# Patient Record
Sex: FEMALE | Race: WHITE | NOT HISPANIC OR LATINO | Employment: FULL TIME | URBAN - METROPOLITAN AREA
[De-identification: names, ages, dates, MRNs, and addresses within clinical notes are randomized per-mention and may not be internally consistent; named-entity substitution may affect disease eponyms.]

---

## 2017-04-24 ENCOUNTER — OFFICE VISIT (OUTPATIENT)
Dept: OBSTETRICS AND GYNECOLOGY | Facility: CLINIC | Age: 57
End: 2017-04-24
Payer: COMMERCIAL

## 2017-04-24 VITALS
HEART RATE: 70 BPM | BODY MASS INDEX: 23.92 KG/M2 | SYSTOLIC BLOOD PRESSURE: 110 MMHG | DIASTOLIC BLOOD PRESSURE: 78 MMHG | HEIGHT: 63 IN | RESPIRATION RATE: 16 BRPM | WEIGHT: 135 LBS

## 2017-04-24 DIAGNOSIS — R87.610 ASCUS WITH POSITIVE HIGH RISK HPV CERVICAL: Primary | ICD-10-CM

## 2017-04-24 DIAGNOSIS — R87.810 ASCUS WITH POSITIVE HIGH RISK HPV CERVICAL: Primary | ICD-10-CM

## 2017-04-24 PROCEDURE — 88141 CYTOPATH C/V INTERPRET: CPT | Mod: ,,, | Performed by: PATHOLOGY

## 2017-04-24 PROCEDURE — 88175 CYTOPATH C/V AUTO FLUID REDO: CPT | Performed by: PATHOLOGY

## 2017-04-24 PROCEDURE — 1160F RVW MEDS BY RX/DR IN RCRD: CPT | Mod: S$GLB,,, | Performed by: OBSTETRICS & GYNECOLOGY

## 2017-04-24 PROCEDURE — 99999 PR PBB SHADOW E&M-EST. PATIENT-LVL III: CPT | Mod: PBBFAC,,, | Performed by: OBSTETRICS & GYNECOLOGY

## 2017-04-24 PROCEDURE — 99213 OFFICE O/P EST LOW 20 MIN: CPT | Mod: S$GLB,,, | Performed by: OBSTETRICS & GYNECOLOGY

## 2017-04-24 NOTE — PROGRESS NOTES
Subjective:    Patient ID: Cyndy Lau is a 57 y.o. female    Chief Complaint:   Chief Complaint   Patient presents with    Follow-up     6 month repeat pap        History of Present Illness:  Cyndy BREAUX presents today for follow up of an abnormal PAP. Her last PAP was performed on 8/16/2016 and was read as ASCUS and positive HPV. She has not had problems with vaginal discharge or abnormal bleeding. She denies pelvic pain.  Colposcopy findings on 10/3/2016 revealed normal exam without visible pathology. ECC was negative.    Review of Systems   Constitutional: Negative for activity change, appetite change, chills, diaphoresis, fatigue, fever and unexpected weight change.   Respiratory: Negative for cough, shortness of breath and wheezing.    Cardiovascular: Negative for chest pain, palpitations and leg swelling.   Gastrointestinal: Negative for abdominal pain, blood in stool, constipation, diarrhea, nausea and vomiting.   Endocrine: Negative for diabetes, hair loss, hot flashes, hyperthyroidism and hypothyroidism.   Genitourinary: Negative for decreased libido, dyspareunia, dysuria, flank pain, frequency, genital sores, hematuria, menorrhagia, menstrual problem, pelvic pain, urgency, vaginal bleeding, vaginal discharge, vaginal pain, urinary incontinence, postcoital bleeding and vaginal odor.   Hematological: Negative for adenopathy. Does not bruise/bleed easily.   Psychiatric/Behavioral: Negative for sleep disturbance. The patient is not nervous/anxious.    Breast: Negative for breast pain and nipple discharge        Objective:    Vital Signs  Vitals:    04/24/17 0855   BP: 110/78   Pulse: 70   Resp: 16         Physical Exam:  General:  alert,normal appearing gravid female   Abdomen:  soft, non-tender. Bowel sounds normal. No masses,  no organomegaly   Pelvis: External genitalia: normal general appearance  Urinary system: urethral meatus normal, bladder nontender  Vaginal: normal mucosa without prolapse or  lesions  Cervix: normal appearance  Uterus: normal single, nontender  Adnexa: normal bimanual exam       Assessment:      1. ASCUS with positive high risk HPV cervical          Plan:      ASCUS with positive high risk HPV cervical  -     Liquid-based pap smear, diagnostic    Other orders  -     Cancel: Liquid-based pap smear, diagnostic

## 2017-04-28 ENCOUNTER — OFFICE VISIT (OUTPATIENT)
Dept: OBSTETRICS AND GYNECOLOGY | Facility: CLINIC | Age: 57
End: 2017-04-28
Payer: COMMERCIAL

## 2017-04-28 ENCOUNTER — TELEPHONE (OUTPATIENT)
Dept: OBSTETRICS AND GYNECOLOGY | Facility: CLINIC | Age: 57
End: 2017-04-28

## 2017-04-28 VITALS
SYSTOLIC BLOOD PRESSURE: 114 MMHG | BODY MASS INDEX: 23.78 KG/M2 | DIASTOLIC BLOOD PRESSURE: 72 MMHG | HEIGHT: 63 IN | WEIGHT: 134.19 LBS | HEART RATE: 73 BPM | RESPIRATION RATE: 12 BRPM

## 2017-04-28 DIAGNOSIS — N93.9 VAGINAL BLEEDING: Primary | ICD-10-CM

## 2017-04-28 DIAGNOSIS — R39.9 UTI SYMPTOMS: ICD-10-CM

## 2017-04-28 DIAGNOSIS — K59.09 OTHER CONSTIPATION: ICD-10-CM

## 2017-04-28 LAB
BILIRUB SERPL-MCNC: ABNORMAL MG/DL
BLOOD URINE, POC: ABNORMAL
COLOR, POC UA: ABNORMAL
GLUCOSE UR QL STRIP: ABNORMAL
KETONES UR QL STRIP: ABNORMAL
LEUKOCYTE ESTERASE URINE, POC: ABNORMAL
NITRITE, POC UA: ABNORMAL
PH, POC UA: 7
PROTEIN, POC: ABNORMAL
SPECIFIC GRAVITY, POC UA: 1.01
UROBILINOGEN, POC UA: ABNORMAL

## 2017-04-28 PROCEDURE — 81002 URINALYSIS NONAUTO W/O SCOPE: CPT | Mod: S$GLB,,, | Performed by: OBSTETRICS & GYNECOLOGY

## 2017-04-28 PROCEDURE — 99999 PR PBB SHADOW E&M-EST. PATIENT-LVL III: CPT | Mod: PBBFAC,,, | Performed by: OBSTETRICS & GYNECOLOGY

## 2017-04-28 PROCEDURE — 99213 OFFICE O/P EST LOW 20 MIN: CPT | Mod: 25,S$GLB,, | Performed by: OBSTETRICS & GYNECOLOGY

## 2017-04-28 PROCEDURE — 87086 URINE CULTURE/COLONY COUNT: CPT

## 2017-04-28 PROCEDURE — 1160F RVW MEDS BY RX/DR IN RCRD: CPT | Mod: S$GLB,,, | Performed by: OBSTETRICS & GYNECOLOGY

## 2017-04-28 NOTE — PROGRESS NOTES
Subjective:       Patient ID: Cyndy Lau is a 57 y.o. female.    Chief Complaint:  Vaginal Bleeding (pt states she had a pap done monday and has had on and off spotting that has gotten heavier today) and Pelvic Pain      History of Present Illness  HPI  Patient presents for evaluation of bleeding that started after pap last week; bleeding was initially heavy but is now improving.  Bleeding is now light and only with wiping.  She has no other urinary complaints of urgency/frequency/dysuria. She complains of some pressure and pain in her pelvis over the past week.    GYN & OB History  Patient's last menstrual period was 2013.   Date of Last Pap: 2016    OB History    Para Term  AB SAB TAB Ectopic Multiple Living   2 2 2       2      # Outcome Date GA Lbr Jamin/2nd Weight Sex Delivery Anes PTL Lv   2 Term         Y   1 Term         Y          Review of Systems  Review of Systems   Constitutional: Negative for chills, fatigue and fever.   Gastrointestinal: Positive for constipation. Negative for abdominal distention, abdominal pain, diarrhea, nausea and vomiting.   Genitourinary: Positive for pelvic pain and vaginal bleeding. Negative for decreased urine volume, difficulty urinating, dysuria, frequency, vaginal discharge and vaginal pain.           Objective:    Physical Exam:   Constitutional: She is oriented to person, place, and time. She appears well-developed and well-nourished. No distress.    HENT:   Head: Normocephalic and atraumatic.    Eyes: Conjunctivae and EOM are normal.    Neck: Normal range of motion. Neck supple.     Pulmonary/Chest: Effort normal.          Genitourinary: There is no tenderness or lesion on the right labia. There is no tenderness or lesion on the left labia. Uterus is not enlarged and not tender. Right adnexum displays no mass and no tenderness. Left adnexum displays no mass and no tenderness. No erythema or bleeding in the vagina. No foreign body in the  vagina. No signs of injury around the vagina. No vaginal discharge found. Cervix exhibits no motion tenderness and no friability.           Musculoskeletal: Normal range of motion. She exhibits no deformity.       Neurological: She is alert and oriented to person, place, and time.    Skin: Skin is warm and dry.    Psychiatric: She has a normal mood and affect. Her behavior is normal. Judgment and thought content normal.          Assessment:        1. Vaginal bleeding    2. UTI symptoms    3. Other constipation               Plan:      Cyndy BREAUX was seen today for vaginal bleeding and pelvic pain.    Diagnoses and all orders for this visit:    Vaginal bleeding  -     POCT URINE DIPSTICK WITHOUT MICROSCOPE  -     Urine culture; Future    UTI symptoms  -     POCT URINE DIPSTICK WITHOUT MICROSCOPE  -     Urine culture; Future    Other constipation    No visible blood present in vagina on exam.  Will further evaluate urine for possible infection.  Constipation noted on exam discussed with patient. Increase H2O hydration and start colace.

## 2017-04-28 NOTE — TELEPHONE ENCOUNTER
Pt complaining of spotting since pap 4/24/17. Yesterday bleeding increased and began cramping. Pt scheduled today for evaluation.

## 2017-04-28 NOTE — TELEPHONE ENCOUNTER
----- Message from Lauren Grigsby sent at 2017  7:57 AM CDT -----  Contact: Self  Cyndy Lau  MRN: [id number]  : 1960  PCP: Carlin Shafer  Home Phone      [phone]  Work Phone      Not on file.  Mobile          [phone]      MESSAGE:   Patient states she is having cramping and heavy spotting after getting her pap smear on 2017. Please return call @ [phone]. Thanks.

## 2017-04-30 LAB — BACTERIA UR CULT: NO GROWTH

## 2017-05-15 ENCOUNTER — OFFICE VISIT (OUTPATIENT)
Dept: OBSTETRICS AND GYNECOLOGY | Facility: CLINIC | Age: 57
End: 2017-05-15
Payer: COMMERCIAL

## 2017-05-15 VITALS
HEIGHT: 63 IN | WEIGHT: 136 LBS | DIASTOLIC BLOOD PRESSURE: 78 MMHG | BODY MASS INDEX: 24.1 KG/M2 | HEART RATE: 82 BPM | SYSTOLIC BLOOD PRESSURE: 120 MMHG | RESPIRATION RATE: 16 BRPM

## 2017-05-15 DIAGNOSIS — R87.615 UNSATISFACTORY CERVICAL PAPANICOLAOU SMEAR: Primary | ICD-10-CM

## 2017-05-15 PROCEDURE — 1160F RVW MEDS BY RX/DR IN RCRD: CPT | Mod: S$GLB,,, | Performed by: OBSTETRICS & GYNECOLOGY

## 2017-05-15 PROCEDURE — 88175 CYTOPATH C/V AUTO FLUID REDO: CPT

## 2017-05-15 PROCEDURE — 99213 OFFICE O/P EST LOW 20 MIN: CPT | Mod: S$GLB,,, | Performed by: OBSTETRICS & GYNECOLOGY

## 2017-05-15 PROCEDURE — 99999 PR PBB SHADOW E&M-EST. PATIENT-LVL III: CPT | Mod: PBBFAC,,, | Performed by: OBSTETRICS & GYNECOLOGY

## 2017-05-15 NOTE — PROGRESS NOTES
Subjective:    Patient ID: Cyndy Lau is a 57 y.o. female    Chief Complaint:   Chief Complaint   Patient presents with    Gynecologic Exam     repeat pap--- unsat.       History of Present Illness:  Cyndy BREAUX presents today for follow up of an Unsatisfactory PAP. Her last PAP was performed on 8/16/2016 and was read as unsatisfactory due to too few cells.     Review of Systems   Constitutional: Negative for activity change, appetite change, chills, diaphoresis, fatigue, fever and unexpected weight change.   Respiratory: Negative for cough, shortness of breath and wheezing.    Cardiovascular: Negative for chest pain, palpitations and leg swelling.   Gastrointestinal: Negative for abdominal pain, blood in stool, constipation, diarrhea, nausea and vomiting.   Endocrine: Negative for diabetes, hair loss, hot flashes, hyperthyroidism and hypothyroidism.   Genitourinary: Negative for decreased libido, dyspareunia, dysuria, flank pain, frequency, genital sores, hematuria, menorrhagia, menstrual problem, pelvic pain, urgency, vaginal bleeding, vaginal discharge, vaginal pain, urinary incontinence, postcoital bleeding and vaginal odor.   Hematological: Negative for adenopathy. Does not bruise/bleed easily.   Psychiatric/Behavioral: Negative for sleep disturbance. The patient is not nervous/anxious.    Breast: Negative for breast pain and nipple discharge        Objective:    Vital Signs  Vitals:    05/15/17 1155   BP: 120/78   Pulse: 82   Resp: 16         Physical Exam:  General:  alert,normal appearing gravid female   Abdomen:  soft, non-tender. Bowel sounds normal. No masses,  no organomegaly   Pelvis: External genitalia: normal general appearance  Urinary system: urethral meatus normal, bladder nontender  Vaginal: normal mucosa without prolapse or lesions  Cervix: normal appearance  Uterus: normal single, nontender  Adnexa: normal bimanual exam       Assessment:      1. Unsatisfactory cervical Papanicolaou smear           Plan:      Unsatisfactory cervical Papanicolaou smear  -     Liquid-based pap smear, screening

## 2017-10-10 ENCOUNTER — TELEPHONE (OUTPATIENT)
Dept: OBSTETRICS AND GYNECOLOGY | Facility: CLINIC | Age: 57
End: 2017-10-10

## 2017-10-10 DIAGNOSIS — Z12.31 ENCOUNTER FOR SCREENING MAMMOGRAM FOR BREAST CANCER: Primary | ICD-10-CM

## 2017-10-10 NOTE — TELEPHONE ENCOUNTER
----- Message from Latoya Cisneros MA sent at 10/10/2017  9:25 AM CDT -----  Contact: self  Cyndy Lau  MRN: [id number]  Home Phone      [phone]  Work Phone      Not on file.  Mobile          [phone]    Patient Care Team:  Carlin Shafer MD as PCP - General (Family Medicine)  Lorena Vanessa MD as Obstetrician (Obstetrics and Gynecology)  OB? No  What phone number can you be reached at? [phone]  Message:   Please link mammo orders to appt 11/07/17.  Thanks!

## 2017-11-07 ENCOUNTER — OFFICE VISIT (OUTPATIENT)
Dept: OBSTETRICS AND GYNECOLOGY | Facility: CLINIC | Age: 57
End: 2017-11-07
Payer: COMMERCIAL

## 2017-11-07 ENCOUNTER — HOSPITAL ENCOUNTER (OUTPATIENT)
Dept: RADIOLOGY | Facility: HOSPITAL | Age: 57
Discharge: HOME | End: 2017-11-07
Attending: OBSTETRICS & GYNECOLOGY
Payer: COMMERCIAL

## 2017-11-07 VITALS
WEIGHT: 136 LBS | BODY MASS INDEX: 24.1 KG/M2 | DIASTOLIC BLOOD PRESSURE: 80 MMHG | HEIGHT: 63 IN | SYSTOLIC BLOOD PRESSURE: 120 MMHG | HEART RATE: 78 BPM | RESPIRATION RATE: 16 BRPM

## 2017-11-07 VITALS — WEIGHT: 136 LBS | BODY MASS INDEX: 24.1 KG/M2 | HEIGHT: 63 IN

## 2017-11-07 DIAGNOSIS — Z12.31 ENCOUNTER FOR SCREENING MAMMOGRAM FOR BREAST CANCER: ICD-10-CM

## 2017-11-07 DIAGNOSIS — G47.09 OTHER INSOMNIA: ICD-10-CM

## 2017-11-07 DIAGNOSIS — Z01.419 WELL WOMAN EXAM WITH ROUTINE GYNECOLOGICAL EXAM: Primary | ICD-10-CM

## 2017-11-07 DIAGNOSIS — Z87.42 HISTORY OF ABNORMAL CERVICAL PAP SMEAR: ICD-10-CM

## 2017-11-07 PROCEDURE — 77063 BREAST TOMOSYNTHESIS BI: CPT | Mod: 26,,, | Performed by: RADIOLOGY

## 2017-11-07 PROCEDURE — 88175 CYTOPATH C/V AUTO FLUID REDO: CPT

## 2017-11-07 PROCEDURE — 99999 PR PBB SHADOW E&M-EST. PATIENT-LVL III: CPT | Mod: PBBFAC,,, | Performed by: OBSTETRICS & GYNECOLOGY

## 2017-11-07 PROCEDURE — 77067 SCR MAMMO BI INCL CAD: CPT | Mod: 26,,, | Performed by: RADIOLOGY

## 2017-11-07 PROCEDURE — 99396 PREV VISIT EST AGE 40-64: CPT | Mod: S$GLB,,, | Performed by: OBSTETRICS & GYNECOLOGY

## 2017-11-07 PROCEDURE — 77067 SCR MAMMO BI INCL CAD: CPT | Mod: TC

## 2017-11-07 RX ORDER — MAGNESIUM 30 MG
TABLET ORAL ONCE
COMMUNITY

## 2017-11-07 RX ORDER — IBUPROFEN 100 MG/5ML
2000 SUSPENSION, ORAL (FINAL DOSE FORM) ORAL DAILY
COMMUNITY

## 2017-11-07 RX ORDER — CETIRIZINE HYDROCHLORIDE 10 MG/1
10 TABLET ORAL DAILY
COMMUNITY

## 2017-11-07 RX ORDER — CLONAZEPAM 0.5 MG/1
0.5 TABLET ORAL DAILY PRN
Qty: 30 TABLET | Refills: 3 | Status: SHIPPED | OUTPATIENT
Start: 2017-11-07 | End: 2020-03-11 | Stop reason: SDUPTHER

## 2017-11-07 NOTE — PROGRESS NOTES
Subjective:    Patient ID: Cyndy Lau is a 57 y.o. female.     Chief Complaint: Annual Well Woman Exam     History of Present Illness:  Cyndy BREAUX presents today for Annual Well Woman exam. .Patient's last menstrual period was 2013.. She is currently using no hormone replacement therapy and she reports no problems with hot flashes, night sweats, irritability and vaginal dryness. She denies breast tenderness, masses, nipple discharge.  She reports no problems with urination. Bowel movements have not significantly changed. She had an abnormal PAP in 2016 which was read as ASCUS with +HR HPV. Repeat PAP 6 months ago was normal.    Menstrual History:   Patient's last menstrual period was 2013..     OB History      Para Term  AB Living    2 2 2     2    SAB TAB Ectopic Multiple Live Births            2          Review of Systems   Constitutional: Negative for activity change, appetite change, chills, diaphoresis, fatigue, fever and unexpected weight change.   HENT: Negative for mouth sores and tinnitus.    Eyes: Negative for discharge and visual disturbance.   Respiratory: Negative for cough, shortness of breath and wheezing.    Cardiovascular: Negative for chest pain, palpitations and leg swelling.   Gastrointestinal: Negative for abdominal pain, blood in stool, constipation, diarrhea, nausea and vomiting.   Endocrine: Negative for diabetes, hair loss, hot flashes, hyperthyroidism and hypothyroidism.   Genitourinary: Negative for decreased libido, dyspareunia, dysuria, flank pain, frequency, genital sores, hematuria, menorrhagia, menstrual problem, pelvic pain, urgency, vaginal bleeding, vaginal discharge, vaginal pain, urinary incontinence, postcoital bleeding and vaginal odor.   Musculoskeletal: Negative for back pain, joint swelling and myalgias.   Skin:  Negative for rash, no acne and hair changes.   Neurological: Negative for seizures, syncope, numbness and headaches.   Hematological:  Negative for adenopathy. Does not bruise/bleed easily.   Psychiatric/Behavioral: Positive for sleep disturbance. The patient is not nervous/anxious.    Breast: Negative for breast pain and nipple discharge        Objective:    Vital Signs:  Vitals:    11/07/17 1643   BP: 120/80   Pulse: 78   Resp: 16       Physical Exam:  General:  alert,normal appearing gravid female   Skin:  Skin color, texture, turgor normal. No rashes or lesions   HEENT:  conjunctivae/corneas clear. PERRL.   Neck: supple, trachea midline, no adenopathy or thyromegally   Respiratory:  clear to auscultation bilaterally   Heart:  regular rate and rhythm, S1, S2 normal, no murmur, click, rub or gallop   Breasts:   Nipples are protruding and have no nipple discharge. No palpable masses, erythema, skin changes, tenderness, or adenopathy.   Abdomen:  soft, non-tender. Bowel sounds normal. No masses,  no organomegaly   Pelvis: External genitalia: normal general appearance  Urinary system: urethral meatus normal, bladder nontender  Vaginal: normal mucosa without prolapse or lesions, atrophic mucosa  Cervix: normal appearance  Uterus: normal single, nontender  Adnexa: normal bimanual exam   Extremities: Normal ROM; no edema, no cyanosis   Neurologial: Normal strength and tone. No focal numbness or weakness. Reflexes 2+ and equal.   Psychiatric: normal mood, speech, dress, and thought processes         Assessment:      1. Well woman exam with routine gynecological exam    2. History of abnormal cervical Pap smear    3. Other insomnia          Plan:      Well woman exam with routine gynecological exam    History of abnormal cervical Pap smear  -     Liquid-based pap smear, diagnostic    Other insomnia  -     clonazePAM (KLONOPIN) 0.5 MG tablet; Take 1 tablet (0.5 mg total) by mouth daily as needed for Anxiety.  Dispense: 30 tablet; Refill: 3        COUNSELING:  Cyndy BREAUX was counseled on A.C.O.G. Pap guidelines and recommendations for yearly pelvic exams in  addition to recommendations for yearly mammograms and monthly self breast exams. In addition she was counseled on adequate intake of calcium and vitamin D; to see her PCP for other health maintenance.

## 2018-10-03 ENCOUNTER — TELEPHONE (OUTPATIENT)
Dept: OBSTETRICS AND GYNECOLOGY | Facility: CLINIC | Age: 58
End: 2018-10-03

## 2018-10-03 DIAGNOSIS — Z12.39 BREAST CANCER SCREENING: Primary | ICD-10-CM

## 2018-10-03 NOTE — TELEPHONE ENCOUNTER
----- Message from Payton Tristan MA sent at 10/3/2018  8:53 AM CDT -----  Contact: Self  Cyndy Lau  MRN: [id number]  Home Phone      [phone]  Work Phone      Not on file.  Mobile          [phone]    Patient Care Team:  Carlin Shafer MD as PCP - General (Family Medicine)  Lorena Vanessa MD as Obstetrician (Obstetrics and Gynecology)  OB? No  What phone number can you be reached at? [phone]  Message: Please place and link mammogram orders to appointment scheduled on 12/10/2018.

## 2018-12-10 ENCOUNTER — HOSPITAL ENCOUNTER (OUTPATIENT)
Dept: RADIOLOGY | Facility: HOSPITAL | Age: 58
Discharge: HOME | End: 2018-12-10
Attending: OBSTETRICS & GYNECOLOGY
Payer: COMMERCIAL

## 2018-12-10 VITALS — HEIGHT: 63 IN | BODY MASS INDEX: 24.1 KG/M2 | WEIGHT: 136 LBS

## 2018-12-10 DIAGNOSIS — Z12.39 BREAST CANCER SCREENING: ICD-10-CM

## 2018-12-10 PROCEDURE — 77067 SCR MAMMO BI INCL CAD: CPT | Mod: 26,,, | Performed by: RADIOLOGY

## 2018-12-10 PROCEDURE — 77063 BREAST TOMOSYNTHESIS BI: CPT | Mod: 26,,, | Performed by: RADIOLOGY

## 2018-12-10 PROCEDURE — 77063 BREAST TOMOSYNTHESIS BI: CPT | Mod: TC

## 2019-02-11 ENCOUNTER — OFFICE VISIT (OUTPATIENT)
Dept: OBSTETRICS AND GYNECOLOGY | Facility: CLINIC | Age: 59
End: 2019-02-11
Payer: COMMERCIAL

## 2019-02-11 VITALS
WEIGHT: 136.81 LBS | SYSTOLIC BLOOD PRESSURE: 112 MMHG | RESPIRATION RATE: 18 BRPM | BODY MASS INDEX: 24.24 KG/M2 | HEART RATE: 80 BPM | HEIGHT: 63 IN | DIASTOLIC BLOOD PRESSURE: 60 MMHG

## 2019-02-11 DIAGNOSIS — Z11.59 ENCOUNTER FOR HEPATITIS C SCREENING TEST FOR LOW RISK PATIENT: ICD-10-CM

## 2019-02-11 DIAGNOSIS — Z01.419 WELL WOMAN EXAM WITH ROUTINE GYNECOLOGICAL EXAM: Primary | ICD-10-CM

## 2019-02-11 DIAGNOSIS — Z12.4 CERVICAL CANCER SCREENING: ICD-10-CM

## 2019-02-11 DIAGNOSIS — M81.0 POSTMENOPAUSAL BONE LOSS: ICD-10-CM

## 2019-02-11 DIAGNOSIS — Z12.11 COLON CANCER SCREENING: ICD-10-CM

## 2019-02-11 PROCEDURE — 88175 CYTOPATH C/V AUTO FLUID REDO: CPT

## 2019-02-11 PROCEDURE — 99396 PREV VISIT EST AGE 40-64: CPT | Mod: S$GLB,,, | Performed by: OBSTETRICS & GYNECOLOGY

## 2019-02-11 PROCEDURE — 99999 PR PBB SHADOW E&M-EST. PATIENT-LVL III: CPT | Mod: PBBFAC,,, | Performed by: OBSTETRICS & GYNECOLOGY

## 2019-02-11 PROCEDURE — 99396 PR PREVENTIVE VISIT,EST,40-64: ICD-10-PCS | Mod: S$GLB,,, | Performed by: OBSTETRICS & GYNECOLOGY

## 2019-02-11 PROCEDURE — 99999 PR PBB SHADOW E&M-EST. PATIENT-LVL III: ICD-10-PCS | Mod: PBBFAC,,, | Performed by: OBSTETRICS & GYNECOLOGY

## 2019-02-11 NOTE — PROGRESS NOTES
Subjective:    Patient ID: Cyndy Lau is a 58 y.o. female.     Chief Complaint: Annual Well Woman Exam     History of Present Illness:  Cyndy BREAUX presents today for Annual Well Woman exam. .Patient's last menstrual period was 2013.. She is currently using no hormone replacement therapy and she reports no problems with hot flashes, night sweats, irritability and vaginal dryness. She denies breast tenderness, masses, nipple discharge.  She reports no problems with urination. Bowel movements have not significantly changed.  She had an abnormal PAP in 2016 which was read as ASCUS with +HR HPV. Her last 2 PAP's have been normal.    Menstrual History:   Patient's last menstrual period was 2013..     OB History      Para Term  AB Living    2 2 2     2    SAB TAB Ectopic Multiple Live Births            2          Review of Systems   Constitutional: Negative for activity change, appetite change, chills, diaphoresis, fatigue, fever and unexpected weight change.   HENT: Negative for mouth sores and tinnitus.    Eyes: Negative for discharge and visual disturbance.   Respiratory: Negative for cough, shortness of breath and wheezing.    Cardiovascular: Negative for chest pain, palpitations and leg swelling.   Gastrointestinal: Negative for abdominal pain, blood in stool, constipation, diarrhea, nausea and vomiting.   Endocrine: Negative for diabetes, hair loss, hot flashes, hyperthyroidism and hypothyroidism.   Genitourinary: Negative for decreased libido, dyspareunia, dysuria, flank pain, frequency, genital sores, hematuria, menorrhagia, menstrual problem, pelvic pain, urgency, vaginal bleeding, vaginal discharge, vaginal pain, urinary incontinence, postcoital bleeding and vaginal odor.   Musculoskeletal: Negative for back pain, joint swelling and myalgias.   Neurological: Negative for seizures, syncope, numbness and headaches.   Hematological: Negative for adenopathy. Does not bruise/bleed easily.    Psychiatric/Behavioral: Negative for sleep disturbance. The patient is not nervous/anxious.    Breast: Negative for mastodynia and nipple discharge        Objective:    Vital Signs:  Vitals:    02/11/19 0826   BP: 112/60   Pulse: 80   Resp: 18       Physical Exam:  General:  alert,normal appearing gravid female   Skin:  Skin color, texture, turgor normal. No rashes or lesions   HEENT:  conjunctivae/corneas clear. PERRL.   Neck: supple, trachea midline, no adenopathy or thyromegally   Respiratory:  clear to auscultation bilaterally   Heart:  regular rate and rhythm, S1, S2 normal, no murmur, click, rub or gallop   Breasts:  Nipples are protruding and have no nipple discharge. No palpable masses, erythema, skin changes, tenderness, or adenopathy.   Abdomen:  soft, non-tender. Bowel sounds normal. No masses,  no organomegaly   Pelvis: External genitalia: normal general appearance  Urinary system: urethral meatus normal, bladder nontender  Vaginal: atrophic mucosa  Cervix: normal appearance  Uterus: normal single, nontender  Adnexa: normal bimanual exam   Extremities: Normal ROM; no edema, no cyanosis   Neurologial: Normal strength and tone. No focal numbness or weakness. Reflexes 2+ and equal.   Psychiatric: normal mood, speech, dress, and thought processes         Assessment:      1. Well woman exam with routine gynecological exam    2. Cervical cancer screening    3. Postmenopausal bone loss    4. Colon cancer screening    5. Encounter for hepatitis C screening test for low risk patient          Plan:      Well woman exam with routine gynecological exam  -     CBC auto differential; Future; Expected date: 02/11/2019  -     Comprehensive metabolic panel; Future; Expected date: 02/11/2019  -     Lipid panel; Future; Expected date: 02/11/2019  -     TSH; Future; Expected date: 02/11/2019    Cervical cancer screening  -     Liquid-based pap smear, screening    Postmenopausal bone loss  -     DXA Bone Density Spine And  Hip; Future; Expected date: 02/11/2019    Colon cancer screening  -     Case request GI: COLONOSCOPY    Encounter for hepatitis C screening test for low risk patient  -     Hepatitis C antibody; Future; Expected date: 02/11/2019        COUNSELING:  Cyndy BREAUX was counseled on A.C.O.G. Pap guidelines and recommendations for yearly pelvic exams in addition to recommendations for yearly mammograms and monthly self breast exams. In addition she was counseled on adequate intake of calcium and vitamin D; to see her PCP for other health maintenance.

## 2019-02-12 ENCOUNTER — HOSPITAL ENCOUNTER (OUTPATIENT)
Dept: RADIOLOGY | Facility: HOSPITAL | Age: 59
Discharge: HOME | End: 2019-02-12
Attending: OBSTETRICS & GYNECOLOGY
Payer: COMMERCIAL

## 2019-02-12 DIAGNOSIS — M81.0 POSTMENOPAUSAL BONE LOSS: ICD-10-CM

## 2019-02-12 PROCEDURE — 77080 DXA BONE DENSITY AXIAL: CPT | Mod: TC

## 2020-02-05 ENCOUNTER — TELEPHONE (OUTPATIENT)
Dept: OBSTETRICS AND GYNECOLOGY | Facility: CLINIC | Age: 60
End: 2020-02-05

## 2020-02-05 DIAGNOSIS — Z12.39 BREAST CANCER SCREENING: Primary | ICD-10-CM

## 2020-02-05 NOTE — TELEPHONE ENCOUNTER
----- Message from Imelda Sims sent at 2/5/2020  8:39 AM CST -----  Contact: self  Please link Mammo orders to appointment scheduled for 3/6/2020.   Annual scheduled for 3/11/2020.

## 2020-03-06 ENCOUNTER — HOSPITAL ENCOUNTER (OUTPATIENT)
Dept: RADIOLOGY | Facility: HOSPITAL | Age: 60
Discharge: HOME | End: 2020-03-06
Attending: OBSTETRICS & GYNECOLOGY
Payer: COMMERCIAL

## 2020-03-06 VITALS — BODY MASS INDEX: 24.1 KG/M2 | HEIGHT: 63 IN | WEIGHT: 136 LBS

## 2020-03-06 DIAGNOSIS — Z12.39 BREAST CANCER SCREENING: ICD-10-CM

## 2020-03-06 PROCEDURE — 77067 MAMMO DIGITAL SCREENING BILAT WITH TOMOSYNTHESIS_CAD: ICD-10-PCS | Mod: 26,,, | Performed by: RADIOLOGY

## 2020-03-06 PROCEDURE — 77067 SCR MAMMO BI INCL CAD: CPT | Mod: TC

## 2020-03-06 PROCEDURE — 77063 MAMMO DIGITAL SCREENING BILAT WITH TOMOSYNTHESIS_CAD: ICD-10-PCS | Mod: 26,,, | Performed by: RADIOLOGY

## 2020-03-06 PROCEDURE — 77067 SCR MAMMO BI INCL CAD: CPT | Mod: 26,,, | Performed by: RADIOLOGY

## 2020-03-06 PROCEDURE — 77063 BREAST TOMOSYNTHESIS BI: CPT | Mod: 26,,, | Performed by: RADIOLOGY

## 2020-03-11 ENCOUNTER — OFFICE VISIT (OUTPATIENT)
Dept: OBSTETRICS AND GYNECOLOGY | Facility: CLINIC | Age: 60
End: 2020-03-11
Payer: COMMERCIAL

## 2020-03-11 VITALS
HEART RATE: 58 BPM | HEIGHT: 63 IN | WEIGHT: 140.38 LBS | DIASTOLIC BLOOD PRESSURE: 82 MMHG | SYSTOLIC BLOOD PRESSURE: 112 MMHG | BODY MASS INDEX: 24.88 KG/M2 | RESPIRATION RATE: 16 BRPM

## 2020-03-11 DIAGNOSIS — Z12.11 COLON CANCER SCREENING: ICD-10-CM

## 2020-03-11 DIAGNOSIS — Z12.4 CERVICAL CANCER SCREENING: ICD-10-CM

## 2020-03-11 DIAGNOSIS — F51.01 PRIMARY INSOMNIA: ICD-10-CM

## 2020-03-11 DIAGNOSIS — G47.09 OTHER INSOMNIA: ICD-10-CM

## 2020-03-11 DIAGNOSIS — Z01.419 WELL WOMAN EXAM WITH ROUTINE GYNECOLOGICAL EXAM: Primary | ICD-10-CM

## 2020-03-11 PROCEDURE — 99999 PR PBB SHADOW E&M-EST. PATIENT-LVL III: ICD-10-PCS | Mod: PBBFAC,,, | Performed by: OBSTETRICS & GYNECOLOGY

## 2020-03-11 PROCEDURE — 99396 PREV VISIT EST AGE 40-64: CPT | Mod: S$GLB,,, | Performed by: OBSTETRICS & GYNECOLOGY

## 2020-03-11 PROCEDURE — 88141 CYTOPATH C/V INTERPRET: CPT | Mod: ,,, | Performed by: PATHOLOGY

## 2020-03-11 PROCEDURE — 88141 PR  CYTOPATH CERV/VAG INTERPRET: ICD-10-PCS | Mod: ,,, | Performed by: PATHOLOGY

## 2020-03-11 PROCEDURE — 99396 PR PREVENTIVE VISIT,EST,40-64: ICD-10-PCS | Mod: S$GLB,,, | Performed by: OBSTETRICS & GYNECOLOGY

## 2020-03-11 PROCEDURE — 87624 HPV HI-RISK TYP POOLED RSLT: CPT

## 2020-03-11 PROCEDURE — 99999 PR PBB SHADOW E&M-EST. PATIENT-LVL III: CPT | Mod: PBBFAC,,, | Performed by: OBSTETRICS & GYNECOLOGY

## 2020-03-11 PROCEDURE — 88175 CYTOPATH C/V AUTO FLUID REDO: CPT | Mod: 91 | Performed by: PATHOLOGY

## 2020-03-11 RX ORDER — TIMOLOL MALEATE 5 MG/ML
SOLUTION/ DROPS OPHTHALMIC
COMMUNITY
Start: 2020-03-09 | End: 2021-03-17

## 2020-03-11 RX ORDER — CLONAZEPAM 0.5 MG/1
0.5 TABLET ORAL DAILY PRN
Qty: 30 TABLET | Refills: 3 | Status: SHIPPED | OUTPATIENT
Start: 2020-03-11 | End: 2021-03-17 | Stop reason: SDUPTHER

## 2020-03-11 NOTE — PROGRESS NOTES
Subjective:    Patient ID: Cyndy Lau is a 60 y.o. female.     Chief Complaint: Annual Well Woman Exam     History of Present Illness:  Cyndy BREAUX presents today for Annual Well Woman exam. .Patient's last menstrual period was 2013.. She is currently using no hormone therapy and she reports no problems with hot flashes, night sweats, irritability or vaginal dryness. She denies breast tenderness, denies masses, denies nipple discharge. She denies difficulty with urination. Bowel movements have not significantly changed and usually occur once daily. She had an abnormal PAP in 2016 which was read as ASCUS with +HR HPV. Her last 3 PAP's have been normal. She continues with sleep problems but states klonopin has helped.  Menstrual History:   Patient's last menstrual period was 2013..     OB History        2    Para   2    Term   2            AB        Living   2       SAB        TAB        Ectopic        Multiple        Live Births   2                 Review of Systems   Constitutional: Negative for activity change, appetite change, chills, diaphoresis, fatigue, fever and unexpected weight change.   HENT: Negative for mouth sores and tinnitus.    Eyes: Negative for discharge and visual disturbance.   Respiratory: Negative for cough, shortness of breath and wheezing.    Cardiovascular: Negative for chest pain, palpitations and leg swelling.   Gastrointestinal: Negative for abdominal pain, blood in stool, constipation, diarrhea, nausea and vomiting.   Endocrine: Negative for diabetes, hair loss, hot flashes, hyperthyroidism and hypothyroidism.   Genitourinary: Negative for decreased libido, dyspareunia, dysuria, flank pain, frequency, genital sores, hematuria, menorrhagia, menstrual problem, pelvic pain, urgency, vaginal bleeding, vaginal discharge, vaginal pain, urinary incontinence, postcoital bleeding and vaginal odor.   Musculoskeletal: Negative for back pain, joint swelling and myalgias.    Integumentary:  Negative for rash, acne, hair changes and nipple discharge.   Neurological: Negative for seizures, syncope, numbness and headaches.   Hematological: Negative for adenopathy. Does not bruise/bleed easily.   Psychiatric/Behavioral: Positive for sleep disturbance. The patient is not nervous/anxious.    Breast: Negative for mastodynia and nipple discharge        Objective:    Vital Signs:  Vitals:    03/11/20 0727   BP: 112/82   Pulse: (!) 58   Resp: 16       Physical Exam:  General:  alert,normal appearing gravid female   Skin:  Skin color, texture, turgor normal. No rashes or lesions   HEENT:  conjunctivae/corneas clear. PERRL.   Neck: supple, trachea midline, no adenopathy or thyromegally   Respiratory:  clear to auscultation bilaterally   Heart:  regular rate and rhythm, S1, S2 normal, no murmur, click, rub or gallop   Breasts:  Nipples are protruding and have no nipple discharge. No palpable masses, erythema, skin changes, tenderness, or adenopathy.   Abdomen:  Soft, non-tender. Bowel sounds normal. No masses,  no organomegaly   Pelvis: External genitalia: normal general appearance  Urinary system: urethral meatus normal, bladder nontender  Vaginal: normal mucosa without prolapse or lesions  Cervix: normal appearance  Uterus: normal single, nontender  Adnexa: normal bimanual exam   Extremities: Normal ROM; no edema, no cyanosis   Neurologial: Normal strength and tone. No focal numbness or weakness. Reflexes 2+ and equal.   Psychiatric: normal mood, speech, dress, and thought processes         Assessment:      1. Well woman exam with routine gynecological exam    2. Cervical cancer screening    3. Colon cancer screening    4. Other insomnia    5. Primary insomnia          Plan:      Well woman exam with routine gynecological exam    Cervical cancer screening  -     Liquid-Based Pap Smear, Screening    Colon cancer screening  -     Cologuard Screening (Multitarget Stool DNA); Future; Expected date:  03/11/2020    Other insomnia  -     clonazePAM (KLONOPIN) 0.5 MG tablet; Take 1 tablet (0.5 mg total) by mouth daily as needed for Anxiety.  Dispense: 30 tablet; Refill: 3    Primary insomnia  -     clonazePAM (KLONOPIN) 0.5 MG tablet; Take 1 tablet (0.5 mg total) by mouth daily as needed for Anxiety.  Dispense: 30 tablet; Refill: 3        Health Maintenance and Screening:   Cyndy BREAUX was counseled on A.C.O.G. Pap guidelines and recommendations for yearly pelvic exams in addition to recommendations for yearly mammograms and monthly self breast exams, and adequate calcium and vitamin D in her diet.   In addition, a lengthy discussion of needed Health Maintenance Screening was done with Cyndy BREAUX. She appears to be overdue for:  vaccinations for Shingles and Tetanus. She was advised that she may obtain the needed vaccinations from her pharmacy or PCP.  Colon Cancer Screening; she has chosen Cologuard Testing.

## 2020-03-26 ENCOUNTER — TELEPHONE (OUTPATIENT)
Dept: OBSTETRICS AND GYNECOLOGY | Facility: CLINIC | Age: 60
End: 2020-03-26

## 2020-03-26 NOTE — TELEPHONE ENCOUNTER
Cologaurd results received and reviewed by Dr. Green. Pt notified of results and voiced understanding. Copy scanned to chart.

## 2020-04-06 LAB
FINAL PATHOLOGIC DIAGNOSIS: ABNORMAL
Lab: ABNORMAL

## 2020-04-09 LAB
HPV HR 12 DNA SPEC QL NAA+PROBE: POSITIVE
HPV16 AG SPEC QL: NEGATIVE
HPV18 DNA SPEC QL NAA+PROBE: NEGATIVE

## 2020-04-12 NOTE — PROGRESS NOTES
Please inform Cyndy BREAUX that her recent PAP smear revealed ASCUS with POSITIVE high risk HPV. I would like her to follow up with us in 4 week(s) for Colposcopy.

## 2021-01-22 ENCOUNTER — TELEPHONE (OUTPATIENT)
Dept: OBSTETRICS AND GYNECOLOGY | Facility: CLINIC | Age: 61
End: 2021-01-22

## 2021-01-22 DIAGNOSIS — Z12.31 BREAST CANCER SCREENING BY MAMMOGRAM: Primary | ICD-10-CM

## 2021-03-10 ENCOUNTER — HOSPITAL ENCOUNTER (OUTPATIENT)
Dept: RADIOLOGY | Facility: HOSPITAL | Age: 61
Discharge: HOME | End: 2021-03-10
Attending: OBSTETRICS & GYNECOLOGY
Payer: COMMERCIAL

## 2021-03-10 VITALS — WEIGHT: 140 LBS | HEIGHT: 63 IN | BODY MASS INDEX: 24.8 KG/M2

## 2021-03-10 DIAGNOSIS — Z12.31 BREAST CANCER SCREENING BY MAMMOGRAM: ICD-10-CM

## 2021-03-10 PROCEDURE — 77067 SCR MAMMO BI INCL CAD: CPT | Mod: TC

## 2021-03-10 PROCEDURE — 77067 MAMMO DIGITAL SCREENING BILAT WITH TOMO: ICD-10-PCS | Mod: 26,,, | Performed by: RADIOLOGY

## 2021-03-10 PROCEDURE — 77067 SCR MAMMO BI INCL CAD: CPT | Mod: 26,,, | Performed by: RADIOLOGY

## 2021-03-10 PROCEDURE — 77063 BREAST TOMOSYNTHESIS BI: CPT | Mod: 26,,, | Performed by: RADIOLOGY

## 2021-03-10 PROCEDURE — 77063 MAMMO DIGITAL SCREENING BILAT WITH TOMO: ICD-10-PCS | Mod: 26,,, | Performed by: RADIOLOGY

## 2021-03-17 ENCOUNTER — OFFICE VISIT (OUTPATIENT)
Dept: OBSTETRICS AND GYNECOLOGY | Facility: CLINIC | Age: 61
End: 2021-03-17
Payer: COMMERCIAL

## 2021-03-17 VITALS
BODY MASS INDEX: 27.05 KG/M2 | DIASTOLIC BLOOD PRESSURE: 80 MMHG | HEIGHT: 62 IN | WEIGHT: 147 LBS | SYSTOLIC BLOOD PRESSURE: 132 MMHG

## 2021-03-17 DIAGNOSIS — Z01.419 ENCOUNTER FOR GYNECOLOGICAL EXAMINATION (GENERAL) (ROUTINE) WITHOUT ABNORMAL FINDINGS: Primary | ICD-10-CM

## 2021-03-17 DIAGNOSIS — F51.01 PRIMARY INSOMNIA: ICD-10-CM

## 2021-03-17 DIAGNOSIS — Z12.4 CERVICAL CANCER SCREENING: ICD-10-CM

## 2021-03-17 DIAGNOSIS — G47.09 OTHER INSOMNIA: ICD-10-CM

## 2021-03-17 PROCEDURE — 99396 PREV VISIT EST AGE 40-64: CPT | Mod: S$GLB,,, | Performed by: OBSTETRICS & GYNECOLOGY

## 2021-03-17 PROCEDURE — 87624 HPV HI-RISK TYP POOLED RSLT: CPT | Performed by: OBSTETRICS & GYNECOLOGY

## 2021-03-17 PROCEDURE — 99999 PR PBB SHADOW E&M-EST. PATIENT-LVL III: ICD-10-PCS | Mod: PBBFAC,,, | Performed by: OBSTETRICS & GYNECOLOGY

## 2021-03-17 PROCEDURE — 99396 PR PREVENTIVE VISIT,EST,40-64: ICD-10-PCS | Mod: S$GLB,,, | Performed by: OBSTETRICS & GYNECOLOGY

## 2021-03-17 PROCEDURE — 87625 HPV TYPES 16 & 18 ONLY: CPT | Performed by: OBSTETRICS & GYNECOLOGY

## 2021-03-17 PROCEDURE — 3008F PR BODY MASS INDEX (BMI) DOCUMENTED: ICD-10-PCS | Mod: CPTII,S$GLB,, | Performed by: OBSTETRICS & GYNECOLOGY

## 2021-03-17 PROCEDURE — 3008F BODY MASS INDEX DOCD: CPT | Mod: CPTII,S$GLB,, | Performed by: OBSTETRICS & GYNECOLOGY

## 2021-03-17 PROCEDURE — 1126F PR PAIN SEVERITY QUANTIFIED, NO PAIN PRESENT: ICD-10-PCS | Mod: S$GLB,,, | Performed by: OBSTETRICS & GYNECOLOGY

## 2021-03-17 PROCEDURE — 1126F AMNT PAIN NOTED NONE PRSNT: CPT | Mod: S$GLB,,, | Performed by: OBSTETRICS & GYNECOLOGY

## 2021-03-17 PROCEDURE — 99999 PR PBB SHADOW E&M-EST. PATIENT-LVL III: CPT | Mod: PBBFAC,,, | Performed by: OBSTETRICS & GYNECOLOGY

## 2021-03-17 PROCEDURE — 88175 CYTOPATH C/V AUTO FLUID REDO: CPT | Performed by: OBSTETRICS & GYNECOLOGY

## 2021-03-17 RX ORDER — DORZOLAMIDE HYDROCHLORIDE AND TIMOLOL MALEATE 20; 5 MG/ML; MG/ML
1 SOLUTION/ DROPS OPHTHALMIC 2 TIMES DAILY
COMMUNITY
End: 2022-11-03

## 2021-03-17 RX ORDER — CLONAZEPAM 0.5 MG/1
0.5 TABLET ORAL DAILY PRN
Qty: 30 TABLET | Refills: 3 | Status: SHIPPED | OUTPATIENT
Start: 2021-03-17 | End: 2022-11-03 | Stop reason: SDUPTHER

## 2021-03-17 RX ORDER — ACETAMINOPHEN AND PHENYLEPHRINE HCL 325; 5 MG/1; MG/1
TABLET ORAL
COMMUNITY

## 2021-03-30 LAB
CLINICAL INFO: ABNORMAL
CYTO CVX: ABNORMAL
CYTOLOGIST CVX/VAG CYTO: ABNORMAL
CYTOLOGY CMNT CVX/VAG CYTO-IMP: ABNORMAL
CYTOLOGY PAP THIN PREP EXPLANATION: ABNORMAL
DATE OF PREVIOUS PAP: ABNORMAL
DATE PREVIOUS BX: NO
GEN CATEG CVX/VAG CYTO-IMP: ABNORMAL
HPV I/H RISK 4 DNA CVX QL NAA+PROBE: DETECTED
LMP START DATE: ABNORMAL
PATHOLOGIST CVX/VAG CYTO: ABNORMAL
SPECIMEN SOURCE CVX/VAG CYTO: ABNORMAL
STAT OF ADQ CVX/VAG CYTO-IMP: ABNORMAL

## 2021-03-31 LAB
HPV16 DNA CVX QL PROBE+SIG AMP: NOT DETECTED
HPV18 DNA CVX QL PROBE+SIG AMP: NOT DETECTED

## 2021-04-20 ENCOUNTER — PROCEDURE VISIT (OUTPATIENT)
Dept: OBSTETRICS AND GYNECOLOGY | Facility: CLINIC | Age: 61
End: 2021-04-20
Payer: COMMERCIAL

## 2021-04-20 VITALS
DIASTOLIC BLOOD PRESSURE: 78 MMHG | RESPIRATION RATE: 13 BRPM | BODY MASS INDEX: 27.17 KG/M2 | SYSTOLIC BLOOD PRESSURE: 120 MMHG | WEIGHT: 147.63 LBS | HEIGHT: 62 IN | HEART RATE: 61 BPM

## 2021-04-20 DIAGNOSIS — R87.610 ASCUS WITH POSITIVE HIGH RISK HPV CERVICAL: Primary | ICD-10-CM

## 2021-04-20 DIAGNOSIS — R87.810 ASCUS WITH POSITIVE HIGH RISK HPV CERVICAL: Primary | ICD-10-CM

## 2021-04-20 PROCEDURE — 88342 CHG IMMUNOCYTOCHEMISTRY: ICD-10-PCS | Mod: 26,,, | Performed by: PATHOLOGY

## 2021-04-20 PROCEDURE — 88305 TISSUE EXAM BY PATHOLOGIST: ICD-10-PCS | Mod: 26,,, | Performed by: PATHOLOGY

## 2021-04-20 PROCEDURE — 88305 TISSUE EXAM BY PATHOLOGIST: CPT | Mod: 26,,, | Performed by: PATHOLOGY

## 2021-04-20 PROCEDURE — 88342 IMHCHEM/IMCYTCHM 1ST ANTB: CPT | Mod: 26,,, | Performed by: PATHOLOGY

## 2021-04-20 PROCEDURE — 88342 IMHCHEM/IMCYTCHM 1ST ANTB: CPT | Mod: 59 | Performed by: PATHOLOGY

## 2021-04-20 PROCEDURE — 88305 TISSUE EXAM BY PATHOLOGIST: CPT | Mod: 59 | Performed by: PATHOLOGY

## 2021-04-28 LAB
FINAL PATHOLOGIC DIAGNOSIS: NORMAL
GROSS: NORMAL
Lab: NORMAL

## 2021-05-18 ENCOUNTER — PROCEDURE VISIT (OUTPATIENT)
Dept: OBSTETRICS AND GYNECOLOGY | Facility: CLINIC | Age: 61
End: 2021-05-18
Payer: COMMERCIAL

## 2021-05-18 VITALS
SYSTOLIC BLOOD PRESSURE: 124 MMHG | HEIGHT: 62 IN | HEART RATE: 75 BPM | WEIGHT: 148 LBS | RESPIRATION RATE: 14 BRPM | BODY MASS INDEX: 27.23 KG/M2 | DIASTOLIC BLOOD PRESSURE: 82 MMHG

## 2021-05-18 DIAGNOSIS — Z01.812 ENCOUNTER FOR PREPROCEDURAL LABORATORY EXAMINATION: Primary | ICD-10-CM

## 2021-05-18 DIAGNOSIS — D06.9 HIGH GRADE SQUAMOUS INTRAEPITHELIAL LESION (HGSIL), GRADE 3 CIN, ON BIOPSY OF CERVIX: ICD-10-CM

## 2021-05-18 LAB
B-HCG UR QL: NEGATIVE
CTP QC/QA: YES

## 2021-05-18 PROCEDURE — 81025 URINE PREGNANCY TEST: CPT | Mod: S$GLB,,, | Performed by: OBSTETRICS & GYNECOLOGY

## 2021-05-18 PROCEDURE — 57461 PR COLPOSCOPY,CERVIX W/ADJ VAG,W/LOOP CONIZ: ICD-10-PCS | Mod: S$GLB,,, | Performed by: OBSTETRICS & GYNECOLOGY

## 2021-05-18 PROCEDURE — 88342 IMHCHEM/IMCYTCHM 1ST ANTB: CPT | Performed by: PATHOLOGY

## 2021-05-18 PROCEDURE — 88342 IMHCHEM/IMCYTCHM 1ST ANTB: CPT | Mod: 26,,, | Performed by: PATHOLOGY

## 2021-05-18 PROCEDURE — 88342 CHG IMMUNOCYTOCHEMISTRY: ICD-10-PCS | Mod: 26,,, | Performed by: PATHOLOGY

## 2021-05-18 PROCEDURE — 88307 TISSUE EXAM BY PATHOLOGIST: CPT | Mod: 59 | Performed by: PATHOLOGY

## 2021-05-18 PROCEDURE — 99499 UNLISTED E&M SERVICE: CPT | Mod: S$GLB,,, | Performed by: OBSTETRICS & GYNECOLOGY

## 2021-05-18 PROCEDURE — 88305 TISSUE EXAM BY PATHOLOGIST: ICD-10-PCS | Mod: 26,,, | Performed by: PATHOLOGY

## 2021-05-18 PROCEDURE — 88305 TISSUE EXAM BY PATHOLOGIST: CPT | Performed by: PATHOLOGY

## 2021-05-18 PROCEDURE — 88307 TISSUE EXAM BY PATHOLOGIST: CPT | Mod: 26,,, | Performed by: PATHOLOGY

## 2021-05-18 PROCEDURE — 88305 TISSUE EXAM BY PATHOLOGIST: CPT | Mod: 26,,, | Performed by: PATHOLOGY

## 2021-05-18 PROCEDURE — 88307 PR  SURG PATH,LEVEL V: ICD-10-PCS | Mod: 26,,, | Performed by: PATHOLOGY

## 2021-05-18 PROCEDURE — 81025 POCT URINE PREGNANCY: ICD-10-PCS | Mod: S$GLB,,, | Performed by: OBSTETRICS & GYNECOLOGY

## 2021-05-18 PROCEDURE — 57461 CONZ OF CERVIX W/SCOPE LEEP: CPT | Mod: S$GLB,,, | Performed by: OBSTETRICS & GYNECOLOGY

## 2021-05-18 PROCEDURE — 99499 NO LOS: ICD-10-PCS | Mod: S$GLB,,, | Performed by: OBSTETRICS & GYNECOLOGY

## 2021-05-26 LAB
FINAL PATHOLOGIC DIAGNOSIS: NORMAL
GROSS: NORMAL
Lab: NORMAL

## 2021-05-27 ENCOUNTER — TELEPHONE (OUTPATIENT)
Dept: OBSTETRICS AND GYNECOLOGY | Facility: CLINIC | Age: 61
End: 2021-05-27

## 2021-06-01 ENCOUNTER — OFFICE VISIT (OUTPATIENT)
Dept: OBSTETRICS AND GYNECOLOGY | Facility: CLINIC | Age: 61
End: 2021-06-01
Payer: COMMERCIAL

## 2021-06-01 VITALS
HEART RATE: 58 BPM | WEIGHT: 148 LBS | SYSTOLIC BLOOD PRESSURE: 104 MMHG | HEIGHT: 62 IN | RESPIRATION RATE: 13 BRPM | BODY MASS INDEX: 27.23 KG/M2 | DIASTOLIC BLOOD PRESSURE: 68 MMHG

## 2021-06-01 DIAGNOSIS — N87.1 DYSPLASIA OF CERVIX, HIGH GRADE CIN 2: Primary | ICD-10-CM

## 2021-06-01 DIAGNOSIS — Z98.890 S/P LEEP: ICD-10-CM

## 2021-06-01 PROCEDURE — 99999 PR PBB SHADOW E&M-EST. PATIENT-LVL III: ICD-10-PCS | Mod: PBBFAC,,, | Performed by: OBSTETRICS & GYNECOLOGY

## 2021-06-01 PROCEDURE — 99999 PR PBB SHADOW E&M-EST. PATIENT-LVL III: CPT | Mod: PBBFAC,,, | Performed by: OBSTETRICS & GYNECOLOGY

## 2021-06-01 PROCEDURE — 1126F PR PAIN SEVERITY QUANTIFIED, NO PAIN PRESENT: ICD-10-PCS | Mod: S$GLB,,, | Performed by: OBSTETRICS & GYNECOLOGY

## 2021-06-01 PROCEDURE — 99024 PR POST-OP FOLLOW-UP VISIT: ICD-10-PCS | Mod: S$GLB,,, | Performed by: OBSTETRICS & GYNECOLOGY

## 2021-06-01 PROCEDURE — 1126F AMNT PAIN NOTED NONE PRSNT: CPT | Mod: S$GLB,,, | Performed by: OBSTETRICS & GYNECOLOGY

## 2021-06-01 PROCEDURE — 99024 POSTOP FOLLOW-UP VISIT: CPT | Mod: S$GLB,,, | Performed by: OBSTETRICS & GYNECOLOGY

## 2021-06-01 PROCEDURE — 3008F PR BODY MASS INDEX (BMI) DOCUMENTED: ICD-10-PCS | Mod: CPTII,S$GLB,, | Performed by: OBSTETRICS & GYNECOLOGY

## 2021-06-01 PROCEDURE — 3008F BODY MASS INDEX DOCD: CPT | Mod: CPTII,S$GLB,, | Performed by: OBSTETRICS & GYNECOLOGY

## 2021-10-26 ENCOUNTER — TELEPHONE (OUTPATIENT)
Dept: OBSTETRICS AND GYNECOLOGY | Facility: CLINIC | Age: 61
End: 2021-10-26
Payer: COMMERCIAL

## 2021-11-01 ENCOUNTER — PROCEDURE VISIT (OUTPATIENT)
Dept: OBSTETRICS AND GYNECOLOGY | Facility: CLINIC | Age: 61
End: 2021-11-01
Payer: COMMERCIAL

## 2021-11-01 VITALS
DIASTOLIC BLOOD PRESSURE: 82 MMHG | HEIGHT: 62 IN | SYSTOLIC BLOOD PRESSURE: 126 MMHG | HEART RATE: 98 BPM | WEIGHT: 147.38 LBS | RESPIRATION RATE: 14 BRPM | BODY MASS INDEX: 27.12 KG/M2

## 2021-11-01 DIAGNOSIS — N87.1 DYSPLASIA OF CERVIX, HIGH GRADE CIN 2: Primary | ICD-10-CM

## 2021-11-01 PROCEDURE — 88305 TISSUE EXAM BY PATHOLOGIST: ICD-10-PCS | Mod: 26,,, | Performed by: PATHOLOGY

## 2021-11-01 PROCEDURE — 99499 UNLISTED E&M SERVICE: CPT | Mod: S$GLB,,, | Performed by: OBSTETRICS & GYNECOLOGY

## 2021-11-01 PROCEDURE — 57454 PR COLPOSC,CERVIX W/ADJ VAG,W/BX & CURRETAG: ICD-10-PCS | Mod: ,,, | Performed by: OBSTETRICS & GYNECOLOGY

## 2021-11-01 PROCEDURE — 88305 TISSUE EXAM BY PATHOLOGIST: CPT | Performed by: PATHOLOGY

## 2021-11-01 PROCEDURE — 99499 NO LOS: ICD-10-PCS | Mod: S$GLB,,, | Performed by: OBSTETRICS & GYNECOLOGY

## 2021-11-01 PROCEDURE — 88305 TISSUE EXAM BY PATHOLOGIST: CPT | Mod: 26,,, | Performed by: PATHOLOGY

## 2021-11-01 PROCEDURE — 57454 BX/CURETT OF CERVIX W/SCOPE: CPT | Mod: ,,, | Performed by: OBSTETRICS & GYNECOLOGY

## 2021-11-03 LAB
FINAL PATHOLOGIC DIAGNOSIS: NORMAL
GROSS: NORMAL
Lab: NORMAL

## 2022-08-17 ENCOUNTER — TELEPHONE (OUTPATIENT)
Dept: OBSTETRICS AND GYNECOLOGY | Facility: CLINIC | Age: 62
End: 2022-08-17
Payer: COMMERCIAL

## 2022-08-17 DIAGNOSIS — Z12.31 BREAST CANCER SCREENING BY MAMMOGRAM: Primary | ICD-10-CM

## 2022-11-03 ENCOUNTER — HOSPITAL ENCOUNTER (OUTPATIENT)
Dept: RADIOLOGY | Facility: HOSPITAL | Age: 62
Discharge: HOME | End: 2022-11-03
Attending: OBSTETRICS & GYNECOLOGY
Payer: COMMERCIAL

## 2022-11-03 ENCOUNTER — OFFICE VISIT (OUTPATIENT)
Dept: OBSTETRICS AND GYNECOLOGY | Facility: CLINIC | Age: 62
End: 2022-11-03
Payer: COMMERCIAL

## 2022-11-03 VITALS
HEIGHT: 62 IN | WEIGHT: 147 LBS | BODY MASS INDEX: 27.05 KG/M2 | SYSTOLIC BLOOD PRESSURE: 114 MMHG | WEIGHT: 150.19 LBS | HEART RATE: 85 BPM | BODY MASS INDEX: 27.64 KG/M2 | DIASTOLIC BLOOD PRESSURE: 72 MMHG | HEIGHT: 62 IN | RESPIRATION RATE: 14 BRPM

## 2022-11-03 DIAGNOSIS — F51.01 PRIMARY INSOMNIA: ICD-10-CM

## 2022-11-03 DIAGNOSIS — Z01.419 WELL WOMAN EXAM WITH ROUTINE GYNECOLOGICAL EXAM: Primary | ICD-10-CM

## 2022-11-03 DIAGNOSIS — Z12.11 SCREEN FOR COLON CANCER: ICD-10-CM

## 2022-11-03 DIAGNOSIS — Z12.4 CERVICAL CANCER SCREENING: ICD-10-CM

## 2022-11-03 DIAGNOSIS — G47.09 OTHER INSOMNIA: ICD-10-CM

## 2022-11-03 DIAGNOSIS — Z12.31 BREAST CANCER SCREENING BY MAMMOGRAM: ICD-10-CM

## 2022-11-03 PROCEDURE — 3008F PR BODY MASS INDEX (BMI) DOCUMENTED: ICD-10-PCS | Mod: CPTII,S$GLB,, | Performed by: OBSTETRICS & GYNECOLOGY

## 2022-11-03 PROCEDURE — 1160F RVW MEDS BY RX/DR IN RCRD: CPT | Mod: CPTII,S$GLB,, | Performed by: OBSTETRICS & GYNECOLOGY

## 2022-11-03 PROCEDURE — 77063 BREAST TOMOSYNTHESIS BI: CPT | Mod: TC

## 2022-11-03 PROCEDURE — 88175 CYTOPATH C/V AUTO FLUID REDO: CPT | Performed by: PATHOLOGY

## 2022-11-03 PROCEDURE — 88141 PR  CYTOPATH CERV/VAG INTERPRET: ICD-10-PCS | Mod: ,,, | Performed by: PATHOLOGY

## 2022-11-03 PROCEDURE — 99396 PR PREVENTIVE VISIT,EST,40-64: ICD-10-PCS | Mod: S$GLB,,, | Performed by: OBSTETRICS & GYNECOLOGY

## 2022-11-03 PROCEDURE — 1160F PR REVIEW ALL MEDS BY PRESCRIBER/CLIN PHARMACIST DOCUMENTED: ICD-10-PCS | Mod: CPTII,S$GLB,, | Performed by: OBSTETRICS & GYNECOLOGY

## 2022-11-03 PROCEDURE — 77063 MAMMO DIGITAL SCREENING BILAT WITH TOMO: ICD-10-PCS | Mod: 26,,, | Performed by: RADIOLOGY

## 2022-11-03 PROCEDURE — 3074F SYST BP LT 130 MM HG: CPT | Mod: CPTII,S$GLB,, | Performed by: OBSTETRICS & GYNECOLOGY

## 2022-11-03 PROCEDURE — 99396 PREV VISIT EST AGE 40-64: CPT | Mod: S$GLB,,, | Performed by: OBSTETRICS & GYNECOLOGY

## 2022-11-03 PROCEDURE — 3078F PR MOST RECENT DIASTOLIC BLOOD PRESSURE < 80 MM HG: ICD-10-PCS | Mod: CPTII,S$GLB,, | Performed by: OBSTETRICS & GYNECOLOGY

## 2022-11-03 PROCEDURE — 3008F BODY MASS INDEX DOCD: CPT | Mod: CPTII,S$GLB,, | Performed by: OBSTETRICS & GYNECOLOGY

## 2022-11-03 PROCEDURE — 3074F PR MOST RECENT SYSTOLIC BLOOD PRESSURE < 130 MM HG: ICD-10-PCS | Mod: CPTII,S$GLB,, | Performed by: OBSTETRICS & GYNECOLOGY

## 2022-11-03 PROCEDURE — 99999 PR PBB SHADOW E&M-EST. PATIENT-LVL III: CPT | Mod: PBBFAC,,, | Performed by: OBSTETRICS & GYNECOLOGY

## 2022-11-03 PROCEDURE — 1159F MED LIST DOCD IN RCRD: CPT | Mod: CPTII,S$GLB,, | Performed by: OBSTETRICS & GYNECOLOGY

## 2022-11-03 PROCEDURE — 3078F DIAST BP <80 MM HG: CPT | Mod: CPTII,S$GLB,, | Performed by: OBSTETRICS & GYNECOLOGY

## 2022-11-03 PROCEDURE — 88141 CYTOPATH C/V INTERPRET: CPT | Mod: ,,, | Performed by: PATHOLOGY

## 2022-11-03 PROCEDURE — 77063 BREAST TOMOSYNTHESIS BI: CPT | Mod: 26,,, | Performed by: RADIOLOGY

## 2022-11-03 PROCEDURE — 77067 MAMMO DIGITAL SCREENING BILAT WITH TOMO: ICD-10-PCS | Mod: 26,,, | Performed by: RADIOLOGY

## 2022-11-03 PROCEDURE — 99999 PR PBB SHADOW E&M-EST. PATIENT-LVL III: ICD-10-PCS | Mod: PBBFAC,,, | Performed by: OBSTETRICS & GYNECOLOGY

## 2022-11-03 PROCEDURE — 87624 HPV HI-RISK TYP POOLED RSLT: CPT | Performed by: OBSTETRICS & GYNECOLOGY

## 2022-11-03 PROCEDURE — 77067 SCR MAMMO BI INCL CAD: CPT | Mod: 26,,, | Performed by: RADIOLOGY

## 2022-11-03 PROCEDURE — 1159F PR MEDICATION LIST DOCUMENTED IN MEDICAL RECORD: ICD-10-PCS | Mod: CPTII,S$GLB,, | Performed by: OBSTETRICS & GYNECOLOGY

## 2022-11-03 RX ORDER — CLONAZEPAM 0.5 MG/1
0.5 TABLET ORAL DAILY PRN
Qty: 30 TABLET | Refills: 1 | Status: SHIPPED | OUTPATIENT
Start: 2022-11-03 | End: 2023-11-08

## 2022-11-03 NOTE — PROGRESS NOTES
Subjective:    Patient ID: Cyndy Lau is a 62 y.o. y.o. female.     Chief Complaint: Annual Well Woman Exam     History of Present Illness:  Cyndy BREAUX presents today for Annual Well Woman exam. She describes her menses as  absent .She denies pelvic pain.  She denies breast tenderness, masses, nipple discharge. She denies GYN complaints. She denies difficulty with urination or bowel movements. She denies menopausal symptoms such as hotflashes, vaginal dryness, and night sweats. She denies bloating, early satiety, or weight changes. She is not currently sexually active.     Menstrual History:   Patient's last menstrual period was 2013..     OB History    : 2  Para: 2  Term: 2  Livin  Live Births: 2            The following portions of the patient's history were reviewed and updated as appropriate: allergies, current medications, past family history, past medical history, past social history, past surgical history, and problem list.    ROS:   CONSTITUTIONAL: fatigue, weight gain, Denies: fever, chills, diaphoresis, weakness, weight loss  ENT: negative for sore throat, nasal congestion, nasal discharge, epistaxis, tinnitus, hearing loss  EYES: negative for blurry vision, decreased vision, loss of vision, eye pain, diplopia, photophobia, discharge  SKIN: Negative for rash, itching, hives  RESPIRATORY: negative for cough, hemoptysis, shortness of breath, pleuritic chest pain, wheezing  CARDIOVASCULAR: negative for chest pain, dyspnea on exertion, orthopnea, paroxysmal nocturnal dyspnea, edema, palpitations  BREAST: negative for breast  tenderness, breast mass, nipple discharge, or skin changes  GASTROINTESTINAL: negative for abdominal pain, flank pain, nausea, vomiting, diarrhea, constipation, black stool, blood in stool  GENITOURINARY: negative for abnormal vaginal bleeding, amenorrhea, decreased libido, dysuria, genital sores, hematuria, incontinence, menorrhagia, pelvic pain, urinary frequency,  vaginal discharge  HEMATOLOGIC/LYMPHATIC: negative for swollen lymph nodes, bleeding, bruising  MUSCULOSKELETAL: joint pain, negative for back pain, joint stiffness, joint swelling, muscle pain, muscle weakness  NEUROLOGICAL: negative for dizzy/vertigo, headache, focal weakness, numbness/tingling, speech problems, loss of consciousness, confusion, memory loss  BEHAVORIAL/PSYCH: negative for anxiety, depression, psychosis  ENDOCRINE: negative for polydipsia/polyuria, palpitations, skin changes, temperature intolerance, unexpected weight changes  ALLERGIC/IMMUNOLOGIC: negative for urticaria, hay fever, angioedema      Objective:    Vital Signs:  Vitals:    11/03/22 0943   BP: 114/72   Pulse: 85   Resp: 14       Physical Exam:  General:  alert, cooperative, no distress   Skin:  Skin color, texture, turgor normal. No rashes or lesions   HEENT:  extra ocular movement intact, sclera clear, anicteric   Neck: supple, trachea midline, no adenopathy or thyromegally   Respiratory:  Normal effort   Breasts:  no discharge, erythema, tenderness, or palpable masses; no axillary lymphadenopathy   Abdomen:  soft, nontender, no palpable masses   Pelvis: External genitalia: normal general appearance  Urinary system: urethral meatus normal, bladder nontender  Vaginal: normal without tenderness, induration or masses, atrophic mucosa  Cervix: normal appearance, h/o LEEP  Uterus: normal size, shape, position  Adnexa: non palpable   Extremities: Normal ROM; no edema, no cyanosis   Neurologial: Normal strength and tone. No focal numbness or weakness.   Psychiatric: normal mood, speech, dress, and thought processes         Assessment:       Healthy female exam.     1. Well woman exam with routine gynecological exam    2. Cervical cancer screening    3. Screen for colon cancer    4. Other insomnia    5. Primary insomnia          Plan:      Well woman exam with routine gynecological exam    Cervical cancer screening  -     Liquid-Based Pap  Smear, Screening  -     HPV High Risk Genotypes, PCR    Screen for colon cancer  -     Fecal Immunochemical Test (iFOBT); Future; Expected date: 11/03/2022    Other insomnia  -     clonazePAM (KLONOPIN) 0.5 MG tablet; Take 1 tablet (0.5 mg total) by mouth daily as needed for Anxiety.  Dispense: 30 tablet; Refill: 1    Primary insomnia  -     clonazePAM (KLONOPIN) 0.5 MG tablet; Take 1 tablet (0.5 mg total) by mouth daily as needed for Anxiety.  Dispense: 30 tablet; Refill: 1    MMG pending today.      COUNSELING:  Cyndy BREAUX was counseled on A.C.O.G. Pap guidelines and recommendations for yearly pelvic exams in addition to recommendations for monthly self breast exams; to see her PCP for other health maintenance.

## 2022-11-14 LAB
FINAL PATHOLOGIC DIAGNOSIS: ABNORMAL
Lab: ABNORMAL

## 2022-11-16 ENCOUNTER — LAB VISIT (OUTPATIENT)
Dept: LAB | Facility: HOSPITAL | Age: 62
End: 2022-11-16
Attending: OBSTETRICS & GYNECOLOGY
Payer: COMMERCIAL

## 2022-11-16 DIAGNOSIS — Z12.11 SCREEN FOR COLON CANCER: ICD-10-CM

## 2022-11-16 PROCEDURE — 82274 ASSAY TEST FOR BLOOD FECAL: CPT | Performed by: OBSTETRICS & GYNECOLOGY

## 2022-11-22 LAB — HEMOCCULT STL QL IA: NEGATIVE

## 2022-12-12 ENCOUNTER — PROCEDURE VISIT (OUTPATIENT)
Dept: OBSTETRICS AND GYNECOLOGY | Facility: CLINIC | Age: 62
End: 2022-12-12
Attending: OBSTETRICS & GYNECOLOGY
Payer: COMMERCIAL

## 2022-12-12 VITALS
RESPIRATION RATE: 18 BRPM | BODY MASS INDEX: 28.16 KG/M2 | HEART RATE: 56 BPM | OXYGEN SATURATION: 96 % | WEIGHT: 153 LBS | DIASTOLIC BLOOD PRESSURE: 80 MMHG | SYSTOLIC BLOOD PRESSURE: 126 MMHG | HEIGHT: 62 IN

## 2022-12-12 DIAGNOSIS — R87.810 ASCUS WITH POSITIVE HIGH RISK HPV CERVICAL: Primary | ICD-10-CM

## 2022-12-12 DIAGNOSIS — Z01.812 ENCOUNTER FOR PREPROCEDURAL LABORATORY EXAMINATION: ICD-10-CM

## 2022-12-12 DIAGNOSIS — R87.610 ASCUS WITH POSITIVE HIGH RISK HPV CERVICAL: Primary | ICD-10-CM

## 2022-12-12 LAB
B-HCG UR QL: NEGATIVE
CTP QC/QA: YES

## 2022-12-12 PROCEDURE — 88342 CHG IMMUNOCYTOCHEMISTRY: ICD-10-PCS | Mod: 26,,, | Performed by: PATHOLOGY

## 2022-12-12 PROCEDURE — 57421 EXAM/BIOPSY OF VAG W/SCOPE: CPT | Mod: S$GLB,,, | Performed by: OBSTETRICS & GYNECOLOGY

## 2022-12-12 PROCEDURE — 88305 TISSUE EXAM BY PATHOLOGIST: ICD-10-PCS | Mod: 26,,, | Performed by: PATHOLOGY

## 2022-12-12 PROCEDURE — 88342 IMHCHEM/IMCYTCHM 1ST ANTB: CPT | Mod: 59 | Performed by: PATHOLOGY

## 2022-12-12 PROCEDURE — 57421 COLPOSCOPY: ICD-10-PCS | Mod: S$GLB,,, | Performed by: OBSTETRICS & GYNECOLOGY

## 2022-12-12 PROCEDURE — 88305 TISSUE EXAM BY PATHOLOGIST: CPT | Mod: 26,,, | Performed by: PATHOLOGY

## 2022-12-12 PROCEDURE — 81025 URINE PREGNANCY TEST: CPT | Mod: S$GLB,,, | Performed by: OBSTETRICS & GYNECOLOGY

## 2022-12-12 PROCEDURE — 88305 TISSUE EXAM BY PATHOLOGIST: CPT | Mod: 59 | Performed by: PATHOLOGY

## 2022-12-12 PROCEDURE — 88342 IMHCHEM/IMCYTCHM 1ST ANTB: CPT | Mod: 26,,, | Performed by: PATHOLOGY

## 2022-12-12 PROCEDURE — 81025 POCT URINE PREGNANCY: ICD-10-PCS | Mod: S$GLB,,, | Performed by: OBSTETRICS & GYNECOLOGY

## 2022-12-12 NOTE — PROCEDURES
Colposcopy    Date/Time: 12/12/2022 11:45 AM  Performed by: Lorena Vanessa MD  Authorized by: Lorena Vanessa MD     Consent Done?:  Yes (Written)  Timeout:Immediately prior to procedure a time out was called to verify the correct patient, procedure, equipment, support staff and site/side marked as required    Colposcopy Site:  Vagina and Cervix  Acrowhite Lesion? Yes    Atypical Vessels: No    Transformation Zone Adequate?: No    Biopsy?: Yes         Location:  Cervix ((9 00))  ECC Performed?: Yes    LEEP Performed?: No    Estimated blood loss (cc):  0   Patient tolerated the procedure well with no immediate complications.   Post-operative instructions were provided for the patient.   Patient was discharged and will follow up if any complications occur    Discussed pap/HPV and colpo history. Plan for hysterectomy pending biopsy results.

## 2022-12-19 LAB
FINAL PATHOLOGIC DIAGNOSIS: NORMAL
GROSS: NORMAL
Lab: NORMAL

## 2023-11-06 ENCOUNTER — HOSPITAL ENCOUNTER (OUTPATIENT)
Dept: RADIOLOGY | Facility: HOSPITAL | Age: 63
Discharge: HOME | End: 2023-11-06
Attending: FAMILY MEDICINE
Payer: COMMERCIAL

## 2023-11-06 VITALS — WEIGHT: 153 LBS | HEIGHT: 62 IN | BODY MASS INDEX: 28.16 KG/M2

## 2023-11-06 DIAGNOSIS — Z12.31 ENCOUNTER FOR SCREENING MAMMOGRAM FOR BREAST CANCER: ICD-10-CM

## 2023-11-06 PROCEDURE — 77063 BREAST TOMOSYNTHESIS BI: CPT | Mod: 26,,, | Performed by: RADIOLOGY

## 2023-11-06 PROCEDURE — 77063 MAMMO DIGITAL SCREENING BILAT WITH TOMO: ICD-10-PCS | Mod: 26,,, | Performed by: RADIOLOGY

## 2023-11-06 PROCEDURE — 77067 MAMMO DIGITAL SCREENING BILAT WITH TOMO: ICD-10-PCS | Mod: 26,,, | Performed by: RADIOLOGY

## 2023-11-06 PROCEDURE — 77067 SCR MAMMO BI INCL CAD: CPT | Mod: TC

## 2023-11-06 PROCEDURE — 77067 SCR MAMMO BI INCL CAD: CPT | Mod: 26,,, | Performed by: RADIOLOGY

## 2023-11-08 ENCOUNTER — OFFICE VISIT (OUTPATIENT)
Dept: OBSTETRICS AND GYNECOLOGY | Facility: CLINIC | Age: 63
End: 2023-11-08
Payer: COMMERCIAL

## 2023-11-08 VITALS
HEIGHT: 62 IN | SYSTOLIC BLOOD PRESSURE: 124 MMHG | DIASTOLIC BLOOD PRESSURE: 76 MMHG | BODY MASS INDEX: 28.52 KG/M2 | HEART RATE: 92 BPM | WEIGHT: 155 LBS

## 2023-11-08 DIAGNOSIS — Z01.419 WELL WOMAN EXAM WITH ROUTINE GYNECOLOGICAL EXAM: Primary | ICD-10-CM

## 2023-11-08 DIAGNOSIS — Z12.4 SCREENING FOR CERVICAL CANCER: ICD-10-CM

## 2023-11-08 DIAGNOSIS — Z87.42 HISTORY OF ABNORMAL CERVICAL PAP SMEAR: ICD-10-CM

## 2023-11-08 PROCEDURE — 88175 CYTOPATH C/V AUTO FLUID REDO: CPT | Performed by: PATHOLOGY

## 2023-11-08 PROCEDURE — 1159F MED LIST DOCD IN RCRD: CPT | Mod: CPTII,S$GLB,, | Performed by: OBSTETRICS & GYNECOLOGY

## 2023-11-08 PROCEDURE — 3078F DIAST BP <80 MM HG: CPT | Mod: CPTII,S$GLB,, | Performed by: OBSTETRICS & GYNECOLOGY

## 2023-11-08 PROCEDURE — 3008F PR BODY MASS INDEX (BMI) DOCUMENTED: ICD-10-PCS | Mod: CPTII,S$GLB,, | Performed by: OBSTETRICS & GYNECOLOGY

## 2023-11-08 PROCEDURE — 1159F PR MEDICATION LIST DOCUMENTED IN MEDICAL RECORD: ICD-10-PCS | Mod: CPTII,S$GLB,, | Performed by: OBSTETRICS & GYNECOLOGY

## 2023-11-08 PROCEDURE — 99999 PR PBB SHADOW E&M-EST. PATIENT-LVL III: ICD-10-PCS | Mod: PBBFAC,,, | Performed by: OBSTETRICS & GYNECOLOGY

## 2023-11-08 PROCEDURE — 3008F BODY MASS INDEX DOCD: CPT | Mod: CPTII,S$GLB,, | Performed by: OBSTETRICS & GYNECOLOGY

## 2023-11-08 PROCEDURE — 99999 PR PBB SHADOW E&M-EST. PATIENT-LVL III: CPT | Mod: PBBFAC,,, | Performed by: OBSTETRICS & GYNECOLOGY

## 2023-11-08 PROCEDURE — 3078F PR MOST RECENT DIASTOLIC BLOOD PRESSURE < 80 MM HG: ICD-10-PCS | Mod: CPTII,S$GLB,, | Performed by: OBSTETRICS & GYNECOLOGY

## 2023-11-08 PROCEDURE — 87624 HPV HI-RISK TYP POOLED RSLT: CPT | Performed by: OBSTETRICS & GYNECOLOGY

## 2023-11-08 PROCEDURE — 99396 PREV VISIT EST AGE 40-64: CPT | Mod: S$GLB,,, | Performed by: OBSTETRICS & GYNECOLOGY

## 2023-11-08 PROCEDURE — 3074F PR MOST RECENT SYSTOLIC BLOOD PRESSURE < 130 MM HG: ICD-10-PCS | Mod: CPTII,S$GLB,, | Performed by: OBSTETRICS & GYNECOLOGY

## 2023-11-08 PROCEDURE — 3074F SYST BP LT 130 MM HG: CPT | Mod: CPTII,S$GLB,, | Performed by: OBSTETRICS & GYNECOLOGY

## 2023-11-08 PROCEDURE — 99396 PR PREVENTIVE VISIT,EST,40-64: ICD-10-PCS | Mod: S$GLB,,, | Performed by: OBSTETRICS & GYNECOLOGY

## 2023-11-08 RX ORDER — DORZOLAMIDE HYDROCHLORIDE AND TIMOLOL MALEATE 20; 5 MG/ML; MG/ML
1 SOLUTION/ DROPS OPHTHALMIC 2 TIMES DAILY
COMMUNITY
Start: 2023-09-06

## 2023-11-08 RX ORDER — DICLOFENAC SODIUM 75 MG/1
TABLET, DELAYED RELEASE ORAL
COMMUNITY
Start: 2023-07-28

## 2023-11-08 NOTE — PROGRESS NOTES
Subjective:    Patient ID: Cyndy Lau is a 63 y.o. y.o. female.     Chief Complaint: Annual Well Woman Exam     History of Present Illness:  Cyndy BREAUX presents today for Annual Well Woman exam. She describes her menses as  absent, menopause . She denies pelvic pain.  She denies breast tenderness, masses, nipple discharge. She denies GYN complaints. She denies difficulty with urination or bowel movements. She admits to menopausal symptoms such as hotflashes, vaginal dryness, and night sweats. She denies bloating, early satiety, or weight changes. She is sexually active.     Menstrual History:   Patient's last menstrual period was 2013..     OB History    : 2  Para: 2  Term: 2  Livin  Live Births: 2            The following portions of the patient's history were reviewed and updated as appropriate: allergies, current medications, past family history, past medical history, past social history, past surgical history, and problem list.    ROS:   Review of Systems   Constitutional:  Positive for unexpected weight change. Negative for chills, diaphoresis, fatigue and fever.   HENT:  Positive for postnasal drip, rhinorrhea, sinus pain and tinnitus. Negative for congestion, hearing loss, sinus pressure and sore throat.    Eyes:  Negative for pain, discharge and visual disturbance.   Respiratory:  Negative for apnea, cough, shortness of breath and wheezing.    Cardiovascular:  Negative for chest pain, palpitations and leg swelling.   Gastrointestinal:  Negative for abdominal pain, constipation, diarrhea, nausea and vomiting.   Endocrine: Negative for cold intolerance, heat intolerance, polydipsia, polyphagia and polyuria.   Genitourinary:  Negative for difficulty urinating, dyspareunia, dysuria, enuresis, flank pain, frequency, genital sores, hematuria, menstrual problem, pelvic pain, urgency, vaginal bleeding, vaginal discharge and vaginal pain.   Musculoskeletal:  Negative for arthralgias, back pain,  joint swelling, myalgias, neck pain and neck stiffness.   Skin:  Negative for color change, pallor and rash.   Allergic/Immunologic: Negative for environmental allergies, food allergies and immunocompromised state.   Neurological:  Negative for dizziness, weakness, light-headedness, numbness and headaches.   Hematological:  Negative for adenopathy. Does not bruise/bleed easily.   Psychiatric/Behavioral:  Negative for agitation and confusion. The patient is not nervous/anxious.          Objective:    Vital Signs:  Vitals:    11/08/23 1026   BP: 124/76   Pulse: 92       Physical Exam:  General:  alert, cooperative, no distress   Skin:  Skin color, texture, turgor normal. No rashes or lesions   HEENT:  extra ocular movement intact, sclera clear, anicteric   Neck: supple, trachea midline, no adenopathy or thyromegally   Respiratory:  Normal effort   Breasts:  no discharge, erythema, tenderness, or palpable masses; no axillary lymphadenopathy   Abdomen:  soft, nontender, no palpable masses   Pelvis: External genitalia: normal general appearance  Urinary system: urethral meatus normal, bladder nontender  Vaginal: normal mucosa without prolapse or lesions  Cervix: normal appearance, h/o LEEP  Uterus: normal size, shape, position  Adnexa: non palpable   Extremities: Normal ROM; no edema, no cyanosis   Neurologial: Normal strength and tone. No focal numbness or weakness.   Psychiatric: normal mood, speech, dress, and thought processes         Assessment:       Healthy female exam.     1. Well woman exam with routine gynecological exam    2. History of abnormal cervical Pap smear    3. Screening for cervical cancer          Plan:      Well woman exam with routine gynecological exam  -     Comprehensive Metabolic Panel; Future; Expected date: 11/08/2023  -     CBC Auto Differential; Future; Expected date: 11/08/2023  -     TSH; Future; Expected date: 11/08/2023  -     Lipid Panel; Future; Expected date: 11/08/2023    History  of abnormal cervical Pap smear  -     Liquid-Based Pap Smear, Screening  -     HPV High Risk Genotypes, PCR    Screening for cervical cancer  -     Liquid-Based Pap Smear, Screening  -     HPV High Risk Genotypes, PCR      MMG negative Monday      COUNSELING:  Cyndy BREAUX was counseled on A.C.O.G. Pap guidelines and recommendations for yearly pelvic exams in addition to recommendations for monthly self breast exams; to see her PCP for other health maintenance.

## 2023-11-09 ENCOUNTER — LAB VISIT (OUTPATIENT)
Dept: LAB | Facility: HOSPITAL | Age: 63
End: 2023-11-09
Attending: OBSTETRICS & GYNECOLOGY
Payer: COMMERCIAL

## 2023-11-09 DIAGNOSIS — Z01.419 WELL WOMAN EXAM WITH ROUTINE GYNECOLOGICAL EXAM: ICD-10-CM

## 2023-11-09 LAB
ALBUMIN SERPL BCP-MCNC: 3.8 G/DL (ref 3.5–5.2)
ALP SERPL-CCNC: 80 U/L (ref 55–135)
ALT SERPL W/O P-5'-P-CCNC: 6 U/L (ref 10–44)
ANION GAP SERPL CALC-SCNC: 8 MMOL/L (ref 8–16)
AST SERPL-CCNC: 16 U/L (ref 10–40)
BASOPHILS # BLD AUTO: 0.04 K/UL (ref 0–0.2)
BASOPHILS NFR BLD: 1 % (ref 0–1.9)
BILIRUB SERPL-MCNC: 0.5 MG/DL (ref 0.1–1)
BUN SERPL-MCNC: 21 MG/DL (ref 8–23)
CALCIUM SERPL-MCNC: 9.2 MG/DL (ref 8.7–10.5)
CHLORIDE SERPL-SCNC: 109 MMOL/L (ref 95–110)
CHOLEST SERPL-MCNC: 214 MG/DL (ref 120–199)
CHOLEST/HDLC SERPL: 5.6 {RATIO} (ref 2–5)
CO2 SERPL-SCNC: 24 MMOL/L (ref 23–29)
CREAT SERPL-MCNC: 0.8 MG/DL (ref 0.5–1.4)
DIFFERENTIAL METHOD: ABNORMAL
EOSINOPHIL # BLD AUTO: 0.2 K/UL (ref 0–0.5)
EOSINOPHIL NFR BLD: 4 % (ref 0–8)
ERYTHROCYTE [DISTWIDTH] IN BLOOD BY AUTOMATED COUNT: 12 % (ref 11.5–14.5)
EST. GFR  (NO RACE VARIABLE): >60 ML/MIN/1.73 M^2
GLUCOSE SERPL-MCNC: 91 MG/DL (ref 70–110)
HCT VFR BLD AUTO: 39.2 % (ref 37–48.5)
HDLC SERPL-MCNC: 38 MG/DL (ref 40–75)
HDLC SERPL: 17.8 % (ref 20–50)
HGB BLD-MCNC: 13.3 G/DL (ref 12–16)
IMM GRANULOCYTES # BLD AUTO: 0.01 K/UL (ref 0–0.04)
IMM GRANULOCYTES NFR BLD AUTO: 0.2 % (ref 0–0.5)
LDLC SERPL CALC-MCNC: 157 MG/DL (ref 63–159)
LYMPHOCYTES # BLD AUTO: 1.1 K/UL (ref 1–4.8)
LYMPHOCYTES NFR BLD: 26.5 % (ref 18–48)
MCH RBC QN AUTO: 31.2 PG (ref 27–31)
MCHC RBC AUTO-ENTMCNC: 33.9 G/DL (ref 32–36)
MCV RBC AUTO: 92 FL (ref 82–98)
MONOCYTES # BLD AUTO: 0.4 K/UL (ref 0.3–1)
MONOCYTES NFR BLD: 9.9 % (ref 4–15)
NEUTROPHILS # BLD AUTO: 2.4 K/UL (ref 1.8–7.7)
NEUTROPHILS NFR BLD: 58.4 % (ref 38–73)
NONHDLC SERPL-MCNC: 176 MG/DL
NRBC BLD-RTO: 0 /100 WBC
PLATELET # BLD AUTO: 269 K/UL (ref 150–450)
PMV BLD AUTO: 10.9 FL (ref 9.2–12.9)
POTASSIUM SERPL-SCNC: 4.3 MMOL/L (ref 3.5–5.1)
PROT SERPL-MCNC: 6.6 G/DL (ref 6–8.4)
RBC # BLD AUTO: 4.26 M/UL (ref 4–5.4)
SODIUM SERPL-SCNC: 141 MMOL/L (ref 136–145)
TRIGL SERPL-MCNC: 95 MG/DL (ref 30–150)
TSH SERPL DL<=0.005 MIU/L-ACNC: 1.67 UIU/ML (ref 0.4–4)
WBC # BLD AUTO: 4.04 K/UL (ref 3.9–12.7)

## 2023-11-09 PROCEDURE — 80053 COMPREHEN METABOLIC PANEL: CPT | Performed by: OBSTETRICS & GYNECOLOGY

## 2023-11-09 PROCEDURE — 36415 COLL VENOUS BLD VENIPUNCTURE: CPT | Performed by: OBSTETRICS & GYNECOLOGY

## 2023-11-09 PROCEDURE — 80061 LIPID PANEL: CPT | Performed by: OBSTETRICS & GYNECOLOGY

## 2023-11-09 PROCEDURE — 85025 COMPLETE CBC W/AUTO DIFF WBC: CPT | Performed by: OBSTETRICS & GYNECOLOGY

## 2023-11-09 PROCEDURE — 84443 ASSAY THYROID STIM HORMONE: CPT | Performed by: OBSTETRICS & GYNECOLOGY

## 2023-11-20 LAB
FINAL PATHOLOGIC DIAGNOSIS: ABNORMAL
Lab: ABNORMAL

## 2023-12-04 ENCOUNTER — PROCEDURE VISIT (OUTPATIENT)
Dept: OBSTETRICS AND GYNECOLOGY | Facility: CLINIC | Age: 63
End: 2023-12-04
Payer: COMMERCIAL

## 2023-12-04 VITALS
WEIGHT: 157.38 LBS | BODY MASS INDEX: 28.96 KG/M2 | HEIGHT: 62 IN | SYSTOLIC BLOOD PRESSURE: 114 MMHG | HEART RATE: 58 BPM | DIASTOLIC BLOOD PRESSURE: 72 MMHG

## 2023-12-04 DIAGNOSIS — R87.810 ATYPICAL SQUAMOUS CELL CHANGES OF UNDETERMINED SIGNIFICANCE (ASCUS) ON CERVICAL CYTOLOGY WITH POSITIVE HIGH RISK HUMAN PAPILLOMA VIRUS (HPV): Primary | ICD-10-CM

## 2023-12-04 DIAGNOSIS — R87.610 ATYPICAL SQUAMOUS CELL CHANGES OF UNDETERMINED SIGNIFICANCE (ASCUS) ON CERVICAL CYTOLOGY WITH POSITIVE HIGH RISK HUMAN PAPILLOMA VIRUS (HPV): Primary | ICD-10-CM

## 2023-12-04 PROCEDURE — 88342 IMHCHEM/IMCYTCHM 1ST ANTB: CPT | Mod: 26,,, | Performed by: PATHOLOGY

## 2023-12-04 PROCEDURE — 88305 TISSUE EXAM BY PATHOLOGIST: CPT | Mod: 26,,, | Performed by: PATHOLOGY

## 2023-12-04 PROCEDURE — 88305 TISSUE EXAM BY PATHOLOGIST: CPT | Performed by: PATHOLOGY

## 2023-12-04 PROCEDURE — 88342 IMHCHEM/IMCYTCHM 1ST ANTB: CPT | Performed by: PATHOLOGY

## 2023-12-04 PROCEDURE — 88342 CHG IMMUNOCYTOCHEMISTRY: ICD-10-PCS | Mod: 26,,, | Performed by: PATHOLOGY

## 2023-12-04 PROCEDURE — 88305 TISSUE EXAM BY PATHOLOGIST: ICD-10-PCS | Mod: 26,,, | Performed by: PATHOLOGY

## 2023-12-04 NOTE — PROCEDURES
Colposcopy    Date/Time: 12/4/2023 3:15 PM    Performed by: Lorena Vanessa MD  Authorized by: Lorena Vanessa MD    Consent obatined:  Prior to procedure the appropriate consent was completed and verified  Timeout:Immediately prior to procedure a time out was called to verify the correct patient, procedure, equipment, support staff and site/side marked as required    Colposcopy Site:  Cervix and Vagina  Acrowhite Lesion? Yes    Atypical Vessels: No    Transformation Zone Adequate?: No    Biopsy?: Yes         Location:  Cervix ((3 00 and 6 00))  ECC Performed?: Yes    Estimated blood loss (cc):  1   Patient tolerated the procedure well with no immediate complications.   Post-operative instructions were provided for the patient.   Patient was discharged and will follow up if any complications occur

## 2023-12-11 LAB
FINAL PATHOLOGIC DIAGNOSIS: NORMAL
GROSS: NORMAL
Lab: NORMAL

## 2024-11-12 ENCOUNTER — HOSPITAL ENCOUNTER (OUTPATIENT)
Dept: RADIOLOGY | Facility: HOSPITAL | Age: 64
Discharge: HOME | End: 2024-11-12
Attending: FAMILY MEDICINE
Payer: COMMERCIAL

## 2024-11-12 VITALS — HEIGHT: 62 IN | WEIGHT: 157 LBS | BODY MASS INDEX: 28.89 KG/M2

## 2024-11-12 DIAGNOSIS — Z12.31 ENCOUNTER FOR SCREENING MAMMOGRAM FOR BREAST CANCER: ICD-10-CM

## 2024-11-12 PROCEDURE — 77063 BREAST TOMOSYNTHESIS BI: CPT | Mod: 26,,, | Performed by: RADIOLOGY

## 2024-11-12 PROCEDURE — 77063 BREAST TOMOSYNTHESIS BI: CPT | Mod: TC

## 2024-11-12 PROCEDURE — 77067 SCR MAMMO BI INCL CAD: CPT | Mod: 26,,, | Performed by: RADIOLOGY

## 2024-11-13 ENCOUNTER — OFFICE VISIT (OUTPATIENT)
Dept: OBSTETRICS AND GYNECOLOGY | Facility: CLINIC | Age: 64
End: 2024-11-13
Payer: COMMERCIAL

## 2024-11-13 VITALS
DIASTOLIC BLOOD PRESSURE: 84 MMHG | BODY MASS INDEX: 30.22 KG/M2 | WEIGHT: 164.19 LBS | HEIGHT: 62 IN | SYSTOLIC BLOOD PRESSURE: 128 MMHG | HEART RATE: 51 BPM

## 2024-11-13 DIAGNOSIS — L90.0 LICHEN SCLEROSUS ET ATROPHICUS: ICD-10-CM

## 2024-11-13 DIAGNOSIS — Z12.4 CERVICAL CANCER SCREENING: ICD-10-CM

## 2024-11-13 DIAGNOSIS — Z87.410 HISTORY OF CERVICAL DYSPLASIA: ICD-10-CM

## 2024-11-13 DIAGNOSIS — Z01.419 WELL WOMAN EXAM WITH ROUTINE GYNECOLOGICAL EXAM: Primary | ICD-10-CM

## 2024-11-13 PROCEDURE — 99396 PREV VISIT EST AGE 40-64: CPT | Mod: S$GLB,,, | Performed by: OBSTETRICS & GYNECOLOGY

## 2024-11-13 PROCEDURE — 3008F BODY MASS INDEX DOCD: CPT | Mod: CPTII,S$GLB,, | Performed by: OBSTETRICS & GYNECOLOGY

## 2024-11-13 PROCEDURE — 3074F SYST BP LT 130 MM HG: CPT | Mod: CPTII,S$GLB,, | Performed by: OBSTETRICS & GYNECOLOGY

## 2024-11-13 PROCEDURE — 99999 PR PBB SHADOW E&M-EST. PATIENT-LVL III: CPT | Mod: PBBFAC,,, | Performed by: OBSTETRICS & GYNECOLOGY

## 2024-11-13 PROCEDURE — 1159F MED LIST DOCD IN RCRD: CPT | Mod: CPTII,S$GLB,, | Performed by: OBSTETRICS & GYNECOLOGY

## 2024-11-13 PROCEDURE — 3079F DIAST BP 80-89 MM HG: CPT | Mod: CPTII,S$GLB,, | Performed by: OBSTETRICS & GYNECOLOGY

## 2024-11-13 RX ORDER — CLOBETASOL PROPIONATE 0.5 MG/G
OINTMENT TOPICAL NIGHTLY
Qty: 30 G | Refills: 1 | Status: SHIPPED | OUTPATIENT
Start: 2024-11-13

## 2024-11-13 NOTE — PROGRESS NOTES
Subjective:    Patient ID: Cyndy Lau is a 64 y.o. y.o. female.     Chief Complaint: Annual Well Woman Exam     History of Present Illness:  Cyndy BREAUX presents today for Annual Well Woman exam. She describes her menses as  absent, menopause .She denies pelvic pain.  She denies breast tenderness, masses, nipple discharge. She denies GYN complaints. She denies difficulty with urination or bowel movements. She reports occ/mild menopausal symptoms such as hotflashes, vaginal dryness, and night sweats. She denies bloating, early satiety, or weight changes. She is sexually active.     Menstrual History:   Patient's last menstrual period was 2013..     OB History          2    Para   2    Term   2            AB        Living   2         SAB        IAB        Ectopic        Multiple        Live Births   2                 The following portions of the patient's history were reviewed and updated as appropriate: allergies, current medications, past family history, past medical history, past social history, past surgical history, and problem list.    ROS:   Review of Systems   Constitutional:  Positive for unexpected weight change. Negative for chills, diaphoresis, fatigue and fever.   HENT:  Positive for tinnitus. Negative for congestion, hearing loss, postnasal drip, rhinorrhea, sinus pressure, sinus pain and sore throat.    Eyes:  Negative for pain, discharge and visual disturbance.   Respiratory:  Negative for apnea, cough, shortness of breath and wheezing.    Cardiovascular:  Negative for chest pain, palpitations and leg swelling.   Gastrointestinal:  Negative for abdominal pain, constipation, diarrhea, nausea and vomiting.   Endocrine: Negative for cold intolerance, heat intolerance, polydipsia, polyphagia and polyuria.   Genitourinary:  Negative for difficulty urinating, dyspareunia, dysuria, enuresis, flank pain, frequency, genital sores, hematuria, menstrual problem, pelvic pain, urgency,  vaginal bleeding, vaginal discharge and vaginal pain.   Musculoskeletal:  Negative for arthralgias, back pain, joint swelling, myalgias, neck pain and neck stiffness.   Skin:  Negative for color change, pallor and rash.   Allergic/Immunologic: Negative for environmental allergies, food allergies and immunocompromised state.   Neurological:  Positive for dizziness. Negative for weakness, light-headedness, numbness and headaches.   Hematological:  Negative for adenopathy. Does not bruise/bleed easily.   Psychiatric/Behavioral:  Negative for agitation and confusion. The patient is not nervous/anxious.          Objective:    Vital Signs:  Vitals:    11/13/24 1130   BP: 128/84   Pulse: (!) 51       Physical Exam:   Examination performed with Chaperone present  General:  alert, cooperative, no distress   Skin:  Skin color, texture, turgor normal. No rashes or lesions   HEENT:  extra ocular movement intact, sclera clear, anicteric   Neck: supple, trachea midline, no adenopathy or thyromegally   Respiratory:  Normal effort   Breasts:  no discharge, erythema, tenderness, or palpable masses; no axillary lymphadenopathy   Abdomen:  soft, nontender, no palpable masses   Pelvis See below   Extremities: Normal ROM; no edema, no cyanosis   Neurologial: Normal strength and tone. No focal numbness or weakness.   Psychiatric: normal mood, speech, dress, and thought processes       Physical Exam  Genitourinary:     Vagina: No vaginal discharge, tenderness, bleeding or lesions.      Cervix: No friability or lesion.      Uterus: Normal.       Adnexa:         Right: No tenderness.          Left: No tenderness.               Assessment:       Healthy female exam.     1. Well woman exam with routine gynecological exam    2. Cervical cancer screening    3. History of cervical dysplasia    4. Lichen sclerosus et atrophicus          Plan:      Well woman exam with routine gynecological exam    Cervical cancer screening  -     Liquid-Based Pap  Smear, Screening  -     HPV High Risk Genotypes, PCR    History of cervical dysplasia  -     Liquid-Based Pap Smear, Screening  -     HPV High Risk Genotypes, PCR    Lichen sclerosus et atrophicus  -     clobetasol 0.05% (TEMOVATE) 0.05 % Oint; Apply topically every evening.  Dispense: 30 g; Refill: 1      MMG negative.    F/u L.S. in 3 months         COUNSELING:  Cyndy BREAUX was counseled on A.C.O.G. Pap guidelines and recommendations for yearly pelvic exams in addition to recommendations for monthly self breast exams; to see her PCP for other health maintenance.

## 2025-03-12 ENCOUNTER — OFFICE VISIT (OUTPATIENT)
Dept: OBSTETRICS AND GYNECOLOGY | Facility: CLINIC | Age: 65
End: 2025-03-12
Payer: COMMERCIAL

## 2025-03-12 VITALS
BODY MASS INDEX: 30.29 KG/M2 | SYSTOLIC BLOOD PRESSURE: 112 MMHG | HEART RATE: 51 BPM | DIASTOLIC BLOOD PRESSURE: 74 MMHG | HEIGHT: 62 IN | WEIGHT: 164.63 LBS

## 2025-03-12 DIAGNOSIS — N90.5 ATROPHY OF VULVA: Primary | ICD-10-CM

## 2025-03-12 PROCEDURE — 99999 PR PBB SHADOW E&M-EST. PATIENT-LVL III: CPT | Mod: PBBFAC,,, | Performed by: OBSTETRICS & GYNECOLOGY

## 2025-03-12 PROCEDURE — 99213 OFFICE O/P EST LOW 20 MIN: CPT | Mod: S$GLB,,, | Performed by: OBSTETRICS & GYNECOLOGY

## 2025-03-12 PROCEDURE — 3074F SYST BP LT 130 MM HG: CPT | Mod: CPTII,S$GLB,, | Performed by: OBSTETRICS & GYNECOLOGY

## 2025-03-12 PROCEDURE — 3078F DIAST BP <80 MM HG: CPT | Mod: CPTII,S$GLB,, | Performed by: OBSTETRICS & GYNECOLOGY

## 2025-03-12 PROCEDURE — 1159F MED LIST DOCD IN RCRD: CPT | Mod: CPTII,S$GLB,, | Performed by: OBSTETRICS & GYNECOLOGY

## 2025-03-12 PROCEDURE — 3008F BODY MASS INDEX DOCD: CPT | Mod: CPTII,S$GLB,, | Performed by: OBSTETRICS & GYNECOLOGY

## 2025-03-12 NOTE — PROGRESS NOTES
Subjective:       Patient ID: Cyndy Lau is a 65 y.o. female.    Chief Complaint:  Follow-up (LS)      History of Present Illness  Follow-up  Pertinent negatives include no abdominal pain, arthralgias, chest pain, chills, congestion, coughing, diaphoresis, fatigue, fever, headaches, joint swelling, myalgias, nausea, neck pain, numbness, rash, sore throat, vomiting or weakness.     Pt here for follow up of possible LS seen on bilateral labia at time of annual exam in November.  She has been using topical clobetasol nightly.  No itching, no pain, no burning. No concerns.   Pt does report that she had very large obstetrical lacerations with bad scaring and delayed healing in the noted areas.     GYN & OB History  Patient's last menstrual period was 2013.   Date of Last Pap: 2024    OB History    Para Term  AB Living   2 2 2   2   SAB IAB Ectopic Multiple Live Births       2      # Outcome Date GA Lbr Jamin/2nd Weight Sex Type Anes PTL Lv   2 Term         ARTHUR   1 Term         ARTHUR       Review of Systems  Review of Systems   Constitutional:  Negative for chills, diaphoresis, fatigue, fever and unexpected weight change.   HENT:  Negative for congestion, hearing loss, postnasal drip, rhinorrhea, sinus pressure, sinus pain, sore throat and tinnitus.    Eyes:  Negative for pain, discharge and visual disturbance.   Respiratory:  Negative for apnea, cough, shortness of breath and wheezing.    Cardiovascular:  Negative for chest pain, palpitations and leg swelling.   Gastrointestinal:  Negative for abdominal pain, constipation, diarrhea, nausea and vomiting.   Endocrine: Negative for cold intolerance, heat intolerance, polydipsia, polyphagia and polyuria.   Genitourinary:  Negative for difficulty urinating, dyspareunia, dysuria, enuresis, flank pain, frequency, genital sores, hematuria, menstrual problem, pelvic pain, urgency, vaginal bleeding, vaginal discharge and vaginal pain.   Musculoskeletal:   "Negative for arthralgias, back pain, joint swelling, myalgias, neck pain and neck stiffness.   Skin:  Negative for color change, pallor and rash.   Allergic/Immunologic: Negative for environmental allergies, food allergies and immunocompromised state.   Neurological:  Negative for dizziness, weakness, light-headedness, numbness and headaches.   Hematological:  Negative for adenopathy. Does not bruise/bleed easily.   Psychiatric/Behavioral:  Negative for agitation and confusion. The patient is not nervous/anxious.            Objective:    Physical Exam:   Constitutional: She is oriented to person, place, and time. She appears well-developed and well-nourished. No distress.    HENT:   Head: Normocephalic and atraumatic.    Eyes: Conjunctivae and EOM are normal.      Pulmonary/Chest: Effort normal. No respiratory distress.          Genitourinary:          Genitourinary Comments: Hypopigmentation noted.  Acetic acid solution applied to area with no acetowhite areas noted.             Musculoskeletal: Normal range of motion.       Neurological: She is alert and oriented to person, place, and time.    Skin: Skin is warm and dry.    Psychiatric: She has a normal mood and affect. Her behavior is normal. Judgment and thought content normal.          Assessment:        1. Atrophy of vulva               Plan:      Cyndy Gutierres" was seen today for follow-up.    Diagnoses and all orders for this visit:    Atrophy of vulva      Area appears benign.  No acetowhite lesions present. Suspect chronic changes after obstetrical laceration healing and low estrogen from menopause. Pt will continue to monitor and notify of any changes or start of symptoms.         "